# Patient Record
Sex: FEMALE | Race: WHITE | Employment: OTHER | URBAN - METROPOLITAN AREA
[De-identification: names, ages, dates, MRNs, and addresses within clinical notes are randomized per-mention and may not be internally consistent; named-entity substitution may affect disease eponyms.]

---

## 2017-01-25 PROCEDURE — 85025 COMPLETE CBC W/AUTO DIFF WBC: CPT | Performed by: INTERNAL MEDICINE

## 2017-01-25 PROCEDURE — 36415 COLL VENOUS BLD VENIPUNCTURE: CPT | Performed by: INTERNAL MEDICINE

## 2017-01-30 PROCEDURE — 87338 HPYLORI STOOL AG IA: CPT | Performed by: INTERNAL MEDICINE

## 2017-03-08 PROBLEM — H90.3 SENSORINEURAL HEARING LOSS, BILATERAL: Status: ACTIVE | Noted: 2017-03-08

## 2017-03-13 PROCEDURE — 83516 IMMUNOASSAY NONANTIBODY: CPT | Performed by: INTERNAL MEDICINE

## 2017-03-13 PROCEDURE — 87045 FECES CULTURE AEROBIC BACT: CPT | Performed by: INTERNAL MEDICINE

## 2017-03-13 PROCEDURE — 82656 EL-1 FECAL QUAL/SEMIQ: CPT | Performed by: INTERNAL MEDICINE

## 2017-03-13 PROCEDURE — 82607 VITAMIN B-12: CPT | Performed by: INTERNAL MEDICINE

## 2017-03-13 PROCEDURE — 82784 ASSAY IGA/IGD/IGG/IGM EACH: CPT | Performed by: INTERNAL MEDICINE

## 2017-03-13 PROCEDURE — 36415 COLL VENOUS BLD VENIPUNCTURE: CPT | Performed by: INTERNAL MEDICINE

## 2017-03-13 PROCEDURE — 87046 STOOL CULTR AEROBIC BACT EA: CPT | Performed by: INTERNAL MEDICINE

## 2017-03-13 PROCEDURE — 86255 FLUORESCENT ANTIBODY SCREEN: CPT | Performed by: INTERNAL MEDICINE

## 2017-03-13 PROCEDURE — 82746 ASSAY OF FOLIC ACID SERUM: CPT | Performed by: INTERNAL MEDICINE

## 2017-03-17 PROBLEM — K86.89 PANCREATIC INSUFFICIENCY: Status: ACTIVE | Noted: 2017-03-17

## 2017-12-21 ENCOUNTER — APPOINTMENT (OUTPATIENT)
Dept: CT IMAGING | Facility: HOSPITAL | Age: 67
End: 2017-12-21
Attending: EMERGENCY MEDICINE
Payer: COMMERCIAL

## 2017-12-21 ENCOUNTER — HOSPITAL ENCOUNTER (EMERGENCY)
Facility: HOSPITAL | Age: 67
Discharge: HOME | End: 2017-12-21
Attending: EMERGENCY MEDICINE
Payer: COMMERCIAL

## 2017-12-21 VITALS
TEMPERATURE: 98 F | WEIGHT: 122 LBS | HEART RATE: 82 BPM | RESPIRATION RATE: 18 BRPM | OXYGEN SATURATION: 98 % | SYSTOLIC BLOOD PRESSURE: 135 MMHG | HEIGHT: 63 IN | DIASTOLIC BLOOD PRESSURE: 78 MMHG | BODY MASS INDEX: 21.62 KG/M2

## 2017-12-21 DIAGNOSIS — S39.011A STRAIN OF RECTUS ABDOMINIS MUSCLE, INITIAL ENCOUNTER: Primary | ICD-10-CM

## 2017-12-21 PROCEDURE — 96374 THER/PROPH/DIAG INJ IV PUSH: CPT

## 2017-12-21 PROCEDURE — 80053 COMPREHEN METABOLIC PANEL: CPT | Performed by: EMERGENCY MEDICINE

## 2017-12-21 PROCEDURE — 96361 HYDRATE IV INFUSION ADD-ON: CPT

## 2017-12-21 PROCEDURE — 96375 TX/PRO/DX INJ NEW DRUG ADDON: CPT

## 2017-12-21 PROCEDURE — 96376 TX/PRO/DX INJ SAME DRUG ADON: CPT

## 2017-12-21 PROCEDURE — 99284 EMERGENCY DEPT VISIT MOD MDM: CPT

## 2017-12-21 PROCEDURE — 74176 CT ABD & PELVIS W/O CONTRAST: CPT | Performed by: EMERGENCY MEDICINE

## 2017-12-21 PROCEDURE — 85025 COMPLETE CBC W/AUTO DIFF WBC: CPT | Performed by: EMERGENCY MEDICINE

## 2017-12-21 PROCEDURE — 81003 URINALYSIS AUTO W/O SCOPE: CPT | Performed by: EMERGENCY MEDICINE

## 2017-12-21 PROCEDURE — 83690 ASSAY OF LIPASE: CPT | Performed by: EMERGENCY MEDICINE

## 2017-12-21 RX ORDER — ONDANSETRON 2 MG/ML
4 INJECTION INTRAMUSCULAR; INTRAVENOUS ONCE
Status: COMPLETED | OUTPATIENT
Start: 2017-12-21 | End: 2017-12-21

## 2017-12-21 RX ORDER — HYDROMORPHONE HYDROCHLORIDE 1 MG/ML
0.5 INJECTION, SOLUTION INTRAMUSCULAR; INTRAVENOUS; SUBCUTANEOUS EVERY 30 MIN PRN
Status: DISCONTINUED | OUTPATIENT
Start: 2017-12-21 | End: 2017-12-21

## 2017-12-21 RX ORDER — HYDROCODONE BITARTRATE AND ACETAMINOPHEN 5; 325 MG/1; MG/1
1-2 TABLET ORAL EVERY 4 HOURS PRN
Qty: 20 TABLET | Refills: 0 | Status: SHIPPED | OUTPATIENT
Start: 2017-12-21 | End: 2017-12-28

## 2017-12-21 RX ORDER — SODIUM CHLORIDE 9 MG/ML
125 INJECTION, SOLUTION INTRAVENOUS CONTINUOUS
Status: DISCONTINUED | OUTPATIENT
Start: 2017-12-21 | End: 2017-12-21

## 2017-12-21 NOTE — ED PROVIDER NOTES
Patient Seen in: [facility] Emergency Department    History   Patient presents with:  Abdomen/Flank Pain (GI/)    Stated Complaint: abdominal pain    HPI    54-year-old female presents to the emergency department for evaluation of left-sided abdom function in past. no further problems   • Renal insufficiency    • Unspecified essential hypertension    • Unspecified sinusitis (chronic)    • Urinary tract infection    • Valvular disease     MVP   • Visual impairment     Right eye       Past Surgical Hi 18  Temp: 97.9 °F (36.6 °C)  Temp src: Temporal  SpO2: 100 %  O2 Device: None (Room air)    Current:/89   Pulse 79   Temp 97.9 °F (36.6 °C) (Temporal)   Resp 18   Ht 160 cm (5' 3\")   Wt 55.3 kg   SpO2 98%   BMI 21.61 kg/m²         Physical Exam    G for these tests on the individual orders.    URINALYSIS WITH CULTURE REFLEX       ED Course as of Dec 21 1806  ------------------------------------------------------------  Intravenous access was obtained and the patient was treated with IV fluids, analgesi

## 2017-12-21 NOTE — ED INITIAL ASSESSMENT (HPI)
Pt bent over this afternoon and felt \"pop\" on left side of abdomen and felt pain, Pt called Dr. Oglesby [place] office who advised Pt to come to ER for further evaluation of possible hernia, PT hx bowel obstruction, MS & COPD

## 2018-10-02 PROBLEM — M17.12 PRIMARY OSTEOARTHRITIS OF LEFT KNEE: Status: ACTIVE | Noted: 2018-10-02

## 2018-10-08 ENCOUNTER — HOSPITAL ENCOUNTER (OUTPATIENT)
Dept: ULTRASOUND IMAGING | Age: 68
Discharge: HOME | End: 2018-10-08
Attending: ORTHOPAEDIC SURGERY
Payer: COMMERCIAL

## 2018-10-08 DIAGNOSIS — M17.12 PRIMARY OSTEOARTHRITIS OF LEFT KNEE: ICD-10-CM

## 2018-10-08 DIAGNOSIS — M79.662 PAIN OF LEFT CALF: ICD-10-CM

## 2018-10-08 PROCEDURE — 93971 EXTREMITY STUDY: CPT | Performed by: ORTHOPAEDIC SURGERY

## 2018-10-31 PROCEDURE — 88305 TISSUE EXAM BY PATHOLOGIST: CPT | Performed by: INTERNAL MEDICINE

## 2019-03-08 PROBLEM — M87.00 AVASCULAR NECROSIS (HCC): Status: ACTIVE | Noted: 2019-03-08

## 2019-03-20 PROBLEM — M25.562 LEFT KNEE PAIN, UNSPECIFIED CHRONICITY: Status: ACTIVE | Noted: 2019-03-20

## 2019-03-20 PROBLEM — M25.562 CHRONIC PAIN OF LEFT KNEE: Status: ACTIVE | Noted: 2019-03-20

## 2019-03-20 PROBLEM — G89.29 CHRONIC PAIN OF LEFT KNEE: Status: ACTIVE | Noted: 2019-03-20

## 2019-05-06 RX ORDER — OXYCODONE HCL 10 MG/1
10 TABLET, FILM COATED, EXTENDED RELEASE ORAL
Status: CANCELLED | OUTPATIENT
Start: 2019-05-07 | End: 2019-05-07

## 2019-05-06 RX ORDER — CEFAZOLIN SODIUM/WATER 2 G/20 ML
2 SYRINGE (ML) INTRAVENOUS ONCE
Status: CANCELLED | OUTPATIENT
Start: 2019-05-06 | End: 2019-05-06

## 2019-06-04 ENCOUNTER — LAB ENCOUNTER (OUTPATIENT)
Dept: LAB | Facility: HOSPITAL | Age: 69
End: 2019-06-04
Attending: ORTHOPAEDIC SURGERY
Payer: COMMERCIAL

## 2019-06-04 DIAGNOSIS — M17.12 PRIMARY OSTEOARTHRITIS OF LEFT KNEE: ICD-10-CM

## 2019-06-04 PROBLEM — Z98.890 S/P NAIL SURGERY: Status: ACTIVE | Noted: 2019-06-04

## 2019-06-04 PROCEDURE — 87147 CULTURE TYPE IMMUNOLOGIC: CPT

## 2019-06-04 PROCEDURE — 87081 CULTURE SCREEN ONLY: CPT

## 2019-06-13 ENCOUNTER — APPOINTMENT (OUTPATIENT)
Dept: GENERAL RADIOLOGY | Facility: HOSPITAL | Age: 69
DRG: 470 | End: 2019-06-13
Attending: PHYSICIAN ASSISTANT
Payer: COMMERCIAL

## 2019-06-13 ENCOUNTER — ANESTHESIA (OUTPATIENT)
Dept: SURGERY | Facility: HOSPITAL | Age: 69
End: 2019-06-13

## 2019-06-13 ENCOUNTER — ANESTHESIA EVENT (OUTPATIENT)
Dept: SURGERY | Facility: HOSPITAL | Age: 69
End: 2019-06-13

## 2019-06-13 ENCOUNTER — HOSPITAL ENCOUNTER (INPATIENT)
Facility: HOSPITAL | Age: 69
LOS: 3 days | Discharge: HOME HEALTH | DRG: 470 | End: 2019-06-17
Attending: ORTHOPAEDIC SURGERY | Admitting: ORTHOPAEDIC SURGERY
Payer: COMMERCIAL

## 2019-06-13 DIAGNOSIS — T84.84XA PAINFUL ORTHOPAEDIC HARDWARE (HCC): ICD-10-CM

## 2019-06-13 DIAGNOSIS — M17.12 PRIMARY OSTEOARTHRITIS OF LEFT KNEE: ICD-10-CM

## 2019-06-13 PROCEDURE — 0SRD0J9 REPLACEMENT OF LEFT KNEE JOINT WITH SYNTHETIC SUBSTITUTE, CEMENTED, OPEN APPROACH: ICD-10-PCS | Performed by: ORTHOPAEDIC SURGERY

## 2019-06-13 PROCEDURE — S0028 INJECTION, FAMOTIDINE, 20 MG: HCPCS

## 2019-06-13 PROCEDURE — 76942 ECHO GUIDE FOR BIOPSY: CPT | Performed by: ORTHOPAEDIC SURGERY

## 2019-06-13 PROCEDURE — 0QPH04Z REMOVAL OF INTERNAL FIXATION DEVICE FROM LEFT TIBIA, OPEN APPROACH: ICD-10-PCS | Performed by: ORTHOPAEDIC SURGERY

## 2019-06-13 PROCEDURE — 88311 DECALCIFY TISSUE: CPT | Performed by: ORTHOPAEDIC SURGERY

## 2019-06-13 PROCEDURE — 88305 TISSUE EXAM BY PATHOLOGIST: CPT | Performed by: ORTHOPAEDIC SURGERY

## 2019-06-13 PROCEDURE — 86850 RBC ANTIBODY SCREEN: CPT

## 2019-06-13 PROCEDURE — 3E0T3BZ INTRODUCTION OF ANESTHETIC AGENT INTO PERIPHERAL NERVES AND PLEXI, PERCUTANEOUS APPROACH: ICD-10-PCS | Performed by: ANESTHESIOLOGY

## 2019-06-13 PROCEDURE — 96376 TX/PRO/DX INJ SAME DRUG ADON: CPT

## 2019-06-13 PROCEDURE — 73560 X-RAY EXAM OF KNEE 1 OR 2: CPT | Performed by: PHYSICIAN ASSISTANT

## 2019-06-13 PROCEDURE — 86900 BLOOD TYPING SEROLOGIC ABO: CPT

## 2019-06-13 PROCEDURE — 86901 BLOOD TYPING SEROLOGIC RH(D): CPT

## 2019-06-13 DEVICE — ATTUNE PATELLA MEDIALIZED ANATOMIC 35MM CEMENTED AOX
Type: IMPLANTABLE DEVICE | Site: KNEE | Status: FUNCTIONAL
Brand: ATTUNE

## 2019-06-13 DEVICE — ATTUNE KNEE SYSTEM TIBIAL INSERT ROTATING PLATFORM POSTERIOR STABILIZED 5 6MM AOX
Type: IMPLANTABLE DEVICE | Site: KNEE | Status: FUNCTIONAL
Brand: ATTUNE

## 2019-06-13 DEVICE — ATTUNE KNEE SYSTEM FEMORAL POSTERIOR STABILIZED SIZE 5 LEFT CEMENTED
Type: IMPLANTABLE DEVICE | Site: KNEE | Status: FUNCTIONAL
Brand: ATTUNE

## 2019-06-13 DEVICE — ATTUNE KNEE SYSTEM REVISION CEMENTED STEM CEMENTED 14X30MM
Type: IMPLANTABLE DEVICE | Site: KNEE | Status: FUNCTIONAL
Brand: ATTUNE

## 2019-06-13 DEVICE — SMARTSET GHV GENTAMICIN HIGH VISCOSITY BONE CEMENT 40G
Type: IMPLANTABLE DEVICE | Site: KNEE | Status: FUNCTIONAL
Brand: SMARTSET

## 2019-06-13 RX ORDER — SODIUM CHLORIDE, SODIUM LACTATE, POTASSIUM CHLORIDE, CALCIUM CHLORIDE 600; 310; 30; 20 MG/100ML; MG/100ML; MG/100ML; MG/100ML
INJECTION, SOLUTION INTRAVENOUS CONTINUOUS
Status: DISCONTINUED | OUTPATIENT
Start: 2019-06-13 | End: 2019-06-13 | Stop reason: HOSPADM

## 2019-06-13 RX ORDER — BISACODYL 10 MG
10 SUPPOSITORY, RECTAL RECTAL
Status: DISCONTINUED | OUTPATIENT
Start: 2019-06-13 | End: 2019-06-17

## 2019-06-13 RX ORDER — SODIUM CHLORIDE, SODIUM LACTATE, POTASSIUM CHLORIDE, CALCIUM CHLORIDE 600; 310; 30; 20 MG/100ML; MG/100ML; MG/100ML; MG/100ML
INJECTION, SOLUTION INTRAVENOUS CONTINUOUS
Status: DISCONTINUED | OUTPATIENT
Start: 2019-06-13 | End: 2019-06-15

## 2019-06-13 RX ORDER — TRAZODONE HYDROCHLORIDE 50 MG/1
100 TABLET ORAL NIGHTLY
COMMUNITY
End: 2019-06-24

## 2019-06-13 RX ORDER — ONDANSETRON 2 MG/ML
4 INJECTION INTRAMUSCULAR; INTRAVENOUS AS NEEDED
Status: DISCONTINUED | OUTPATIENT
Start: 2019-06-13 | End: 2019-06-13 | Stop reason: HOSPADM

## 2019-06-13 RX ORDER — HYDROMORPHONE HYDROCHLORIDE 1 MG/ML
0.8 INJECTION, SOLUTION INTRAMUSCULAR; INTRAVENOUS; SUBCUTANEOUS EVERY 2 HOUR PRN
Status: DISPENSED | OUTPATIENT
Start: 2019-06-13 | End: 2019-06-15

## 2019-06-13 RX ORDER — MELATONIN
325
Status: DISCONTINUED | OUTPATIENT
Start: 2019-06-13 | End: 2019-06-13

## 2019-06-13 RX ORDER — METOCLOPRAMIDE HYDROCHLORIDE 5 MG/ML
10 INJECTION INTRAMUSCULAR; INTRAVENOUS EVERY 6 HOURS PRN
Status: ACTIVE | OUTPATIENT
Start: 2019-06-13 | End: 2019-06-15

## 2019-06-13 RX ORDER — DILTIAZEM HYDROCHLORIDE 180 MG/1
360 CAPSULE, EXTENDED RELEASE ORAL DAILY
COMMUNITY
End: 2019-12-03

## 2019-06-13 RX ORDER — ACETAMINOPHEN 325 MG/1
650 TABLET ORAL 4 TIMES DAILY
Status: DISPENSED | OUTPATIENT
Start: 2019-06-13 | End: 2019-06-15

## 2019-06-13 RX ORDER — HYDROMORPHONE HYDROCHLORIDE 1 MG/ML
INJECTION, SOLUTION INTRAMUSCULAR; INTRAVENOUS; SUBCUTANEOUS
Status: COMPLETED
Start: 2019-06-13 | End: 2019-06-13

## 2019-06-13 RX ORDER — ALBUTEROL SULFATE 90 UG/1
2 AEROSOL, METERED RESPIRATORY (INHALATION) EVERY 4 HOURS PRN
Status: DISCONTINUED | OUTPATIENT
Start: 2019-06-13 | End: 2019-06-17

## 2019-06-13 RX ORDER — ZOLPIDEM TARTRATE 5 MG/1
5 TABLET ORAL NIGHTLY PRN
Status: DISCONTINUED | OUTPATIENT
Start: 2019-06-13 | End: 2019-06-17

## 2019-06-13 RX ORDER — OMEPRAZOLE 40 MG/1
40 CAPSULE, DELAYED RELEASE ORAL SEE ADMIN INSTRUCTIONS
COMMUNITY
End: 2019-11-09

## 2019-06-13 RX ORDER — ACETAMINOPHEN 500 MG
1000 TABLET ORAL ONCE
Status: DISCONTINUED | OUTPATIENT
Start: 2019-06-13 | End: 2019-06-13 | Stop reason: HOSPADM

## 2019-06-13 RX ORDER — OXYCODONE HYDROCHLORIDE 15 MG/1
15 TABLET ORAL EVERY 4 HOURS PRN
Status: DISCONTINUED | OUTPATIENT
Start: 2019-06-13 | End: 2019-06-14

## 2019-06-13 RX ORDER — NALOXONE HYDROCHLORIDE 0.4 MG/ML
80 INJECTION, SOLUTION INTRAMUSCULAR; INTRAVENOUS; SUBCUTANEOUS AS NEEDED
Status: DISCONTINUED | OUTPATIENT
Start: 2019-06-13 | End: 2019-06-13 | Stop reason: HOSPADM

## 2019-06-13 RX ORDER — OMEPRAZOLE 20 MG/1
20 CAPSULE, DELAYED RELEASE ORAL
Status: ON HOLD | COMMUNITY
End: 2019-06-15

## 2019-06-13 RX ORDER — ACETAMINOPHEN 325 MG/1
TABLET ORAL
Status: COMPLETED
Start: 2019-06-13 | End: 2019-06-13

## 2019-06-13 RX ORDER — PANTOPRAZOLE SODIUM 40 MG/1
40 TABLET, DELAYED RELEASE ORAL
Status: DISCONTINUED | OUTPATIENT
Start: 2019-06-14 | End: 2019-06-17

## 2019-06-13 RX ORDER — CEFAZOLIN SODIUM/WATER 2 G/20 ML
2 SYRINGE (ML) INTRAVENOUS EVERY 8 HOURS
Status: DISCONTINUED | OUTPATIENT
Start: 2019-06-13 | End: 2019-06-13

## 2019-06-13 RX ORDER — DILTIAZEM HYDROCHLORIDE 180 MG/1
360 CAPSULE, EXTENDED RELEASE ORAL DAILY
Status: DISCONTINUED | OUTPATIENT
Start: 2019-06-13 | End: 2019-06-17

## 2019-06-13 RX ORDER — HYDROMORPHONE HYDROCHLORIDE 1 MG/ML
0.2 INJECTION, SOLUTION INTRAMUSCULAR; INTRAVENOUS; SUBCUTANEOUS EVERY 2 HOUR PRN
Status: ACTIVE | OUTPATIENT
Start: 2019-06-13 | End: 2019-06-15

## 2019-06-13 RX ORDER — POLYETHYLENE GLYCOL 3350 17 G/17G
17 POWDER, FOR SOLUTION ORAL DAILY PRN
Status: DISCONTINUED | OUTPATIENT
Start: 2019-06-13 | End: 2019-06-17

## 2019-06-13 RX ORDER — PRIMIDONE 50 MG/1
50 TABLET ORAL NIGHTLY
Status: DISCONTINUED | OUTPATIENT
Start: 2019-06-13 | End: 2019-06-13

## 2019-06-13 RX ORDER — PROCHLORPERAZINE EDISYLATE 5 MG/ML
10 INJECTION INTRAMUSCULAR; INTRAVENOUS EVERY 6 HOURS PRN
Status: ACTIVE | OUTPATIENT
Start: 2019-06-13 | End: 2019-06-15

## 2019-06-13 RX ORDER — SENNOSIDES 8.6 MG
17.2 TABLET ORAL NIGHTLY
Status: DISCONTINUED | OUTPATIENT
Start: 2019-06-13 | End: 2019-06-17

## 2019-06-13 RX ORDER — HYDROMORPHONE HYDROCHLORIDE 1 MG/ML
0.4 INJECTION, SOLUTION INTRAMUSCULAR; INTRAVENOUS; SUBCUTANEOUS EVERY 5 MIN PRN
Status: DISCONTINUED | OUTPATIENT
Start: 2019-06-13 | End: 2019-06-13 | Stop reason: HOSPADM

## 2019-06-13 RX ORDER — DIPHENHYDRAMINE HCL 25 MG
25 CAPSULE ORAL EVERY 4 HOURS PRN
Status: DISCONTINUED | OUTPATIENT
Start: 2019-06-13 | End: 2019-06-17

## 2019-06-13 RX ORDER — FUROSEMIDE 20 MG/1
20 TABLET ORAL
Status: DISCONTINUED | OUTPATIENT
Start: 2019-06-13 | End: 2019-06-17

## 2019-06-13 RX ORDER — DOCUSATE SODIUM 100 MG/1
100 CAPSULE, LIQUID FILLED ORAL 2 TIMES DAILY
Status: DISCONTINUED | OUTPATIENT
Start: 2019-06-13 | End: 2019-06-17

## 2019-06-13 RX ORDER — SODIUM CHLORIDE 9 MG/ML
INJECTION, SOLUTION INTRAVENOUS CONTINUOUS
Status: DISCONTINUED | OUTPATIENT
Start: 2019-06-13 | End: 2019-06-15

## 2019-06-13 RX ORDER — TIZANIDINE 2 MG/1
2 TABLET ORAL 3 TIMES DAILY PRN
Status: DISCONTINUED | OUTPATIENT
Start: 2019-06-13 | End: 2019-06-14

## 2019-06-13 RX ORDER — LABETALOL HYDROCHLORIDE 5 MG/ML
5 INJECTION, SOLUTION INTRAVENOUS EVERY 5 MIN PRN
Status: DISCONTINUED | OUTPATIENT
Start: 2019-06-13 | End: 2019-06-13 | Stop reason: HOSPADM

## 2019-06-13 RX ORDER — OXYCODONE HYDROCHLORIDE 5 MG/1
5 TABLET ORAL EVERY 4 HOURS PRN
Status: DISCONTINUED | OUTPATIENT
Start: 2019-06-13 | End: 2019-06-14

## 2019-06-13 RX ORDER — ACETAMINOPHEN 325 MG/1
650 TABLET ORAL ONCE
Status: COMPLETED | OUTPATIENT
Start: 2019-06-13 | End: 2019-06-13

## 2019-06-13 RX ORDER — OXYCODONE HYDROCHLORIDE 10 MG/1
10 TABLET ORAL EVERY 4 HOURS PRN
Status: DISCONTINUED | OUTPATIENT
Start: 2019-06-13 | End: 2019-06-14

## 2019-06-13 RX ORDER — SODIUM PHOSPHATE, DIBASIC AND SODIUM PHOSPHATE, MONOBASIC 7; 19 G/133ML; G/133ML
1 ENEMA RECTAL ONCE AS NEEDED
Status: DISCONTINUED | OUTPATIENT
Start: 2019-06-13 | End: 2019-06-17

## 2019-06-13 RX ORDER — FUROSEMIDE 40 MG/1
20 TABLET ORAL 2 TIMES DAILY
COMMUNITY
End: 2020-01-08

## 2019-06-13 RX ORDER — METOCLOPRAMIDE HYDROCHLORIDE 5 MG/ML
10 INJECTION INTRAMUSCULAR; INTRAVENOUS AS NEEDED
Status: DISCONTINUED | OUTPATIENT
Start: 2019-06-13 | End: 2019-06-13 | Stop reason: HOSPADM

## 2019-06-13 RX ORDER — DIPHENHYDRAMINE HYDROCHLORIDE 50 MG/ML
12.5 INJECTION INTRAMUSCULAR; INTRAVENOUS EVERY 4 HOURS PRN
Status: DISCONTINUED | OUTPATIENT
Start: 2019-06-13 | End: 2019-06-17

## 2019-06-13 RX ORDER — MELATONIN
325
Status: DISCONTINUED | OUTPATIENT
Start: 2019-06-14 | End: 2019-06-17

## 2019-06-13 RX ORDER — HYDROMORPHONE HYDROCHLORIDE 1 MG/ML
0.4 INJECTION, SOLUTION INTRAMUSCULAR; INTRAVENOUS; SUBCUTANEOUS EVERY 2 HOUR PRN
Status: DISPENSED | OUTPATIENT
Start: 2019-06-13 | End: 2019-06-15

## 2019-06-13 RX ORDER — DIPHENHYDRAMINE HYDROCHLORIDE 50 MG/ML
25 INJECTION INTRAMUSCULAR; INTRAVENOUS ONCE AS NEEDED
Status: ACTIVE | OUTPATIENT
Start: 2019-06-13 | End: 2019-06-13

## 2019-06-13 RX ORDER — MIDAZOLAM HYDROCHLORIDE 1 MG/ML
1 INJECTION INTRAMUSCULAR; INTRAVENOUS EVERY 5 MIN PRN
Status: DISCONTINUED | OUTPATIENT
Start: 2019-06-13 | End: 2019-06-13 | Stop reason: HOSPADM

## 2019-06-13 RX ORDER — ONDANSETRON 2 MG/ML
4 INJECTION INTRAMUSCULAR; INTRAVENOUS EVERY 4 HOURS PRN
Status: DISPENSED | OUTPATIENT
Start: 2019-06-13 | End: 2019-06-15

## 2019-06-13 RX ORDER — TRAZODONE HYDROCHLORIDE 100 MG/1
100 TABLET ORAL NIGHTLY
Status: DISCONTINUED | OUTPATIENT
Start: 2019-06-13 | End: 2019-06-17

## 2019-06-13 NOTE — BRIEF OP NOTE
Pre-Operative Diagnosis: Primary osteoarthritis of left knee [M17.12]  Painful orthopaedic hardware [facility]) [T84.84XA]     Post-Operative Diagnosis: Primary osteoarthritis of left knee [M17.12]Painful orthopaedic hardware ([address] [T84.84XA]      Procedure Perf

## 2019-06-13 NOTE — INTERVAL H&P NOTE
Pre-op Diagnosis: Primary osteoarthritis of left knee [M17.12]  Painful orthopaedic hardware [facility]) [T84.84XA]    The above referenced H&P was reviewed by Robert Morrison MD on 6/13/2019, the patient was examined and no significant changes have occurred in

## 2019-06-13 NOTE — CONSULTS
.  Reason for consult: periop mgmt    Consulted by: Dr. Taqueria Galvan    PCP: Annalisa Wells MD      History of Present Illness: Patient is a 78year old female with PMH sig for immunodeficiency, MS and L knee OA who presents L TKA revision with tibial nail remova 10/5/2015    Performed by Erasmo Hitchcock MD at [facility] MAIN OR   • ENDOSCOPIC ULTRASOUND (EUS) N/A 2/27/2014    Performed by Erasmo Hitchcock MD at [facility] ENDOSCOPY   • ERCP,DIAGNOSTIC     • ESOPHAGOGASTRODUODENOSCOPY (EGD) N/A 2/22/2016    Performed by Tomas Roth 50 MG Oral Tab Take 100 mg by mouth nightly. Disp:  Rfl:    furosemide 40 MG Oral Tab Take 20 mg by mouth 2 (two) times daily. Disp:  Rfl:    Acetaminophen (ACETAMINOPHEN EXTRA STRENGTH) 500 MG Oral Cap Take 1,000 mg by mouth one time.  Disp:  Rfl:    Sacch Tab EC Take 325 mg by mouth daily. Disp:  Rfl:    Multiple Vitamin (TAB-A-MAXWELL) Oral Tab Take 1 tablet by mouth daily. Disp:  Rfl:    Tiotropium Bromide Monohydrate (SPIRIVA HANDIHALER) 18 MCG Inhalation Cap Inhale 1 capsule into the lungs once daily.  Annetta use: No       Fam Hx  Family History   Problem Relation Age of Onset   • Cancer Sister         CA: breast metastasized to kidneys   • Breast Cancer Sister         dx age late 52's and again age 63's   • Breast Cancer Paternal Grandmother         age 43   • input(s): NA, K, CL, CO2, BUN, CREATSERUM, GLU, CA, CAION, MG, PHOS in the last 168 hours. No results for input(s): ALT, AST, ALB, AMYLASE, LIPASE, LDH in the last 168 hours.     Invalid input(s): ALPHOS, TBIL, DBIL, TPROT         ASSESSMENT / PLAN:    #

## 2019-06-13 NOTE — ANESTHESIA PREPROCEDURE EVALUATION
PRE-OP EVALUATION    Patient Name: Kavya Moreno    Pre-op Diagnosis: Primary osteoarthritis of left knee [M17.12]  Painful orthopaedic hardware ([address] Keyla Liang    Procedure(s):  LEFT TOTAL KNEE ARTHROPLASTY AND TIBIA NAIL REMOVAL    Surgeon(s) an hours as needed for Nausea. (Patient taking differently: 3 (three) times daily.  DISSOLVE 1 tablet (8 mg total) IN mouth every 8 (eight) hours as needed for Nausea. ) Disp: 60 tablet Rfl: 5   [DISCONTINUED] CARTIA  MG Oral Capsule SR 24 Hr TAKE TWO CA pain      (+) anxiety                            Past Surgical History:   Procedure Laterality Date   • APPENDECTOMY     • BENIGN BIOPSY LEFT  1996   • CHOLECYSTECTOMY     • COLECTOMY     • COLECTOMY     • ENDOSCOPIC RETROGRADE CHOLANGIOPANCREATOGRAPHY (ER Right 3/4/2015    Performed by Aquilino Stacy MD at [facility] MAIN OR   • TOTAL ABDOM HYSTERECTOMY     • TOTAL KNEE REPLACEMENT     • VENTRAL HERNIA WITH COMPONENT SEPARATION N/A 9/2/2015    Performed by Pippa Guo MD at [facility]

## 2019-06-13 NOTE — H&P
Clifford Wakefield   5/28/2019 1:30 PM   Office Visit   MRN:  [id number]   Description: 78year old female Provider: Baylee Baird MD Department:  Internal Medicine     Scanning Cover Sheet     Click to print Barcode Encounter Cover Sheet for scanning    Current Outpatient Medications:  Amoxicillin-Pot Clavulanate (AUGMENTIN) 875-125 MG Oral Tab Take 1 tablet by mouth every 12 (twelve) hours.  Disp: 20 tablet Rfl: 0   SUMATRIPTAN SUCCINATE 100 MG Oral Tab TAKE 1 TABLET BY MOUTH EVERY 2 (TWO) HOURS AS NEE Tiotropium Bromide Monohydrate (SPIRIVA HANDIHALER) 18 MCG Inhalation Cap Inhale 1 capsule into the lungs once daily.  Indication: COPD Disp: 30 capsule Rfl: 1   HYDROcodone-acetaminophen  MG Oral Tab Take 1 tablet by mouth every 6 (six) hours as need Worry: Not on file        Inability: Not on file      Transportation needs:        Medical: Not on file        Non-medical: Not on file    Tobacco Use      Smoking status: Former Smoker        Packs/day: 0.10        Years: 4.00        Pack years: .4 LUNGS: clear to auscultation  CARDIO: RRR without murmur  GI: good BS's,no masses, HSM or tenderness  EXTREMITIES: no cyanosis, clubbing or edema, r chin 2cm hematoma nl speech and swallow no d/l seen.  r forearm w bruising but o/w nl.  ekg reviewed, nl and

## 2019-06-13 NOTE — OR NURSING
Patient states she fell twice in the pre-op room on her buttocks after losing her balance, unwitnessed by staff. She denies any injury, states she only landed on her buttocks. Denies any vertigo, denies losing consciousness, denies hitting her head.  She st

## 2019-06-13 NOTE — ANESTHESIA POSTPROCEDURE EVALUATION
[id number] [facility] Patient Status:  Outpatient in a Bed   Age/Gender 78year old female MRN [id number]   Location [address] [facility] Attending Arturo Encarnacion MD   Hosp Day # 0 PCP MD Nata Joyce

## 2019-06-13 NOTE — OPERATIVE REPORT
PATIENT'S NAME: Loraine Asher   ATTENDING PHYSICIAN: Colt Wong MD   OPERATING PHYSICIAN: Colt Wong MD   PATIENT ACCOUNT#:   [de-identified]    LOCATION:  [address] [place] [address],[address] RECORD #:   [id number]    YOB: 1950  AD reapproximate the subcutaneous tissue before closing both incisions with staples. A sponge and Tegaderm were placed over the incisions before placing the leg pizano to be used for the total knee replacement.     A knife was used to make a longitudinal inci placed. The cutting guide was placed and pinned in position. The anterior and posterior femur cuts were made. The lollipop was used to confirm flexion gap was equal to the extension gap before finishing the chamfer cuts and removing the guide.   The goran the same fashion with cement. A clamp was used to hold it in place while the cement hardened.   The knee was then copiously irrigated with pulse lavage irrigation before placing Irrisept into the wound and allowing it to sit for 1 minute before evacuating

## 2019-06-14 ENCOUNTER — APPOINTMENT (OUTPATIENT)
Dept: ULTRASOUND IMAGING | Facility: HOSPITAL | Age: 69
DRG: 470 | End: 2019-06-14
Attending: PHYSICIAN ASSISTANT
Payer: COMMERCIAL

## 2019-06-14 PROBLEM — Z47.89 ORTHOPEDIC AFTERCARE: Status: ACTIVE | Noted: 2019-06-14

## 2019-06-14 PROCEDURE — 97150 GROUP THERAPEUTIC PROCEDURES: CPT

## 2019-06-14 PROCEDURE — 96376 TX/PRO/DX INJ SAME DRUG ADON: CPT

## 2019-06-14 PROCEDURE — 97530 THERAPEUTIC ACTIVITIES: CPT

## 2019-06-14 PROCEDURE — 97116 GAIT TRAINING THERAPY: CPT

## 2019-06-14 PROCEDURE — 96375 TX/PRO/DX INJ NEW DRUG ADDON: CPT

## 2019-06-14 PROCEDURE — 93971 EXTREMITY STUDY: CPT | Performed by: PHYSICIAN ASSISTANT

## 2019-06-14 PROCEDURE — 97166 OT EVAL MOD COMPLEX 45 MIN: CPT

## 2019-06-14 PROCEDURE — 97161 PT EVAL LOW COMPLEX 20 MIN: CPT

## 2019-06-14 PROCEDURE — 85027 COMPLETE CBC AUTOMATED: CPT | Performed by: PHYSICIAN ASSISTANT

## 2019-06-14 RX ORDER — OXYCODONE HCL 10 MG/1
10 TABLET, FILM COATED, EXTENDED RELEASE ORAL EVERY 12 HOURS
Status: DISCONTINUED | OUTPATIENT
Start: 2019-06-14 | End: 2019-06-17

## 2019-06-14 RX ORDER — OXYCODONE HYDROCHLORIDE 15 MG/1
15 TABLET ORAL EVERY 4 HOURS PRN
Status: DISPENSED | OUTPATIENT
Start: 2019-06-14 | End: 2019-06-15

## 2019-06-14 RX ORDER — GABAPENTIN 100 MG/1
100 CAPSULE ORAL
Status: DISCONTINUED | OUTPATIENT
Start: 2019-06-14 | End: 2019-06-17

## 2019-06-14 RX ORDER — TIZANIDINE 4 MG/1
4 TABLET ORAL EVERY 6 HOURS PRN
Status: DISCONTINUED | OUTPATIENT
Start: 2019-06-14 | End: 2019-06-17

## 2019-06-14 RX ORDER — TRAMADOL HYDROCHLORIDE 50 MG/1
50 TABLET ORAL EVERY 6 HOURS
Status: DISCONTINUED | OUTPATIENT
Start: 2019-06-14 | End: 2019-06-16

## 2019-06-14 RX ORDER — OXYCODONE HYDROCHLORIDE 10 MG/1
10 TABLET ORAL EVERY 4 HOURS PRN
Status: DISPENSED | OUTPATIENT
Start: 2019-06-14 | End: 2019-06-15

## 2019-06-14 RX ORDER — HYDROCODONE BITARTRATE AND ACETAMINOPHEN 10; 325 MG/1; MG/1
2 TABLET ORAL EVERY 4 HOURS PRN
Status: DISCONTINUED | OUTPATIENT
Start: 2019-06-15 | End: 2019-06-17

## 2019-06-14 RX ORDER — OXYCODONE HYDROCHLORIDE 5 MG/1
5 TABLET ORAL EVERY 4 HOURS PRN
Status: ACTIVE | OUTPATIENT
Start: 2019-06-14 | End: 2019-06-15

## 2019-06-14 RX ORDER — HYDROCODONE BITARTRATE AND ACETAMINOPHEN 10; 325 MG/1; MG/1
1 TABLET ORAL EVERY 4 HOURS PRN
Status: DISCONTINUED | OUTPATIENT
Start: 2019-06-15 | End: 2019-06-17

## 2019-06-14 RX ORDER — TIZANIDINE 2 MG/1
2 TABLET ORAL EVERY 6 HOURS PRN
Status: DISCONTINUED | OUTPATIENT
Start: 2019-06-14 | End: 2019-06-17

## 2019-06-14 RX ORDER — KETOROLAC TROMETHAMINE 15 MG/ML
15 INJECTION, SOLUTION INTRAMUSCULAR; INTRAVENOUS EVERY 6 HOURS PRN
Status: DISCONTINUED | OUTPATIENT
Start: 2019-06-14 | End: 2019-06-17

## 2019-06-14 NOTE — PAYOR COMM NOTE
--------------  ADMISSION REVIEW     Payor: [address] [place] [address] Road #:  [id number]  Authorization Number: [id number]    Admit date: 6/14/19  Admit time: 0802       Direct admit to OR for:    PREOPERATIVE DIAGNOSIS:  Left knee p replacement.     A knife was used to make a longitudinal incision at the anterior aspect of the knee.  The Bovie was used for subcutaneous dissection and hemostasis.  Dissection was carried down to the extensor mechanism, which was incised in line with the finishing the chamfer cuts and removing the guide.  The finishing guide was then placed and pinned in position.  The notch cut was made before removing the guide.  Attention was turned to the posterior aspect where the osteophytes were removed and soft tis wound and allowing it to sit for 1 minute before evacuating the solution.   TXA was then placed in the wound and allowed to sit for 3 minutes while the cement hardened.  When the cement had hardened, the knee was flexed to confirm no further cement needed t Ignacio Mendieta MD at [facility] ENDOSCOPY   • ENDOSCOPIC ULTRASOUND (EUS) N/A 10/5/2015     Performed by Ignacio Mendieta MD at [facility] MAIN OR   • ENDOSCOPIC ULTRASOUND (EUS) N/A 2/27/2014     Performed by Ignacio Mendieta MD at [facility] ENDOSCOPY   • ERCP,DIAGNOSTIC     History    Tobacco Use      Smoking status: Former Smoker        Packs/day: 0.10        Years: 4.00        Pack years: .4        Types: Cigarettes        Quit date: 1972        Years since quittin.4      Smokeless tobacco: Former User      Tobacc ADMINISTERED IN LAST 1 DAY:  acetaminophen (TYLENOL) tab 650 mg     Date Action Dose Route User    6/14/2019 1000 Given 650 mg Oral Johanna Wang RN    6/13/2019 2035 Given 650 mg Oral Ibis Jackson RN      acetaminophen (TYLENOL) tab 650 mg     D Action Dose Route User    6/14/2019 1206 Given 0.8 mg Intravenous Iris [place], RN    6/14/2019 0955 Given 0.8 mg Intravenous Iris [place], RN    6/14/2019 0555 Given 0.8 mg Intravenous [place] Paul, TIANA    6/14/2019 0231 Given 0.8 mg Ronaia Servando 6/14/2019 0522 Given 40 mg Oral Hunter TIANA Bella      [facility]) tab 17.2 mg     Date Action Dose Route User    6/13/2019 2035 Given 17.2 mg Oral Hunter TIANA Bella      0.9%  NaCl infusion     Date Action Dose Route User    6/13/2019 2147 New

## 2019-06-14 NOTE — PHYSICAL THERAPY NOTE
Order received for PT eval. Attempted to see Pt this am, however, Pt awaiting US Doppler to r/o DVT. Will follow up as patient appropriate and schedule permits.

## 2019-06-14 NOTE — HOME CARE LIAISON
MET WITH PTNT AND OFFERED CHOICE  OF AGENCIES. PTNT AGREEABLE TO [facility]. MET WITH PTNT TO DISCUSS HOME HEALTH SERVICES AND COVERAGE CRITERIA. PTNT AGREEABLE TO [facility]. PTNT GIVEN RESIDENTIAL BROCHURE.  RESIDENTIAL WITH PROVIDE SN/PT ON DISC

## 2019-06-14 NOTE — OCCUPATIONAL THERAPY NOTE
OCCUPATIONAL THERAPY      Patient off the floor for Doppler to r/o DVT. Will attempt later as medically appropriate.

## 2019-06-14 NOTE — PHYSICAL THERAPY NOTE
PHYSICAL THERAPY KNEE TREATMENT NOTE - INPATIENT     Room Number: 386/386-A     Session: 1&2   Number of Visits to Meet Established Goals: 6    Presenting Problem: s/p L TKA and tibial nail removal 6/13/19     History related to current admission: Pt is a 2/27/2014    Performed by Cory Salas MD at [facility] ENDOSCOPY   • ENDOSCOPIC ULTRASOUND (EUS) N/A 10/5/2015    Performed by Cory Salas MD at [facility] MAIN OR   • ENDOSCOPIC ULTRASOUND (EUS) N/A 2/27/2014    Performed by Cory Salas MD at [facility] ENDOSCOPY doing too much because I have MS. \"    Patient’s self-stated goal is to rest.    OBJECTIVE  Precautions:  (MS)    WEIGHT BEARING STATUS  Weight Bearing Restriction: L lower extremity           L Lower Extremity: Weight Bearing as Tolerated    PAIN ASSESSMEN assist level, Cues provided to improve heel to toe and step through pattern to promote normal gait pattern. Pt unable to tolerate heel to toe due to ankle pain in left le.  Pt needed encouragement to meet the distance goal.   Walker adjusted to right height assistive device at assistance level: supervision    Goal #4    AROM 0 degrees extension to 75 degrees flexion      Goal #5         Goal #6           Goal Comments: Goals established on 6/14/2019

## 2019-06-14 NOTE — PLAN OF CARE
Pt having a lot of pain, partially controlled with the PO pain meds on the ortho pain protocol. Requests the IV pain meds shortly after receiving the PO pain meds. Pt noted to be sleeping each time staff enters room, but arouses to her name being called.  H

## 2019-06-14 NOTE — PHYSICAL THERAPY NOTE
PHYSICAL THERAPY KNEE EVALUATION - INPATIENT     Room Number: 386/386-A  Evaluation Date: 6/14/2019  Type of Evaluation: Initial  Physician Order: PT Eval and Treat    Presenting Problem: s/p L TKA and tibial nail removal 6/13/19  Reason for Therapy: Bear [place] • COLECTOMY     • COLECTOMY     • ENDOSCOPIC RETROGRADE CHOLANGIOPANCREATOGRAPHY (ERCP) N/A 2/27/2014    Performed by Arsh Lowe MD at [facility] (EUS) N/A 10/5/2015    Performed by Arsh Lowe MD at [facility] N/A 9/2/2015    Performed by Petr Degroot MD at [facility]  Type of Home: House   Home Layout: Two level  Stairs to Enter : 2  Railing: Yes  Stairs to Bedroom: 12  Railing: Yes    Lives With: Spouse  Drives: Yes  [facility] A Little   How much help from another person does the patient currently need. ..   -   Moving to and from a bed to a chair (including a wheelchair)?: A Little   -   Need to walk in hospital room?: A Lot   -   Climbing 3-5 steps with a railing?: A Lot 6/13/2019 for L TKA and removal of tibial nail. Pertinent comorbidities and personal factors impacting therapy include MS.  In this PT evaluation, the patient presents with the following impairments L knee pain, limited L knee ROM, L LE strength deficits, b

## 2019-06-14 NOTE — PROGRESS NOTES
[facility]  Progress Note    [place] Patient Status:  Inpatient    3/8/1950 MRN [id number]   [facility] 3SW-A Attending Vane Sotelo MD   Hosp Day # 0 PCP Agustin Gómez MD     SUBJECTIVE:  INTERVAL HISTORY: S/P  0  Pro PT/OT  5. Discharge planning: [facility]  6.  Continue medical management      Humza Flood PA-C  6/14/2019  8:11 AM

## 2019-06-14 NOTE — PROGRESS NOTES
[facility] Hospitalist note    PCP: Nora Palm MD    Chief Complaint:  F/u s/p TKA revision    SUBJECTIVE:  Pt with severe pain this morning. Currently improved this afternoon. No sob/dizziness/nausea.      OBJECTIVE:  Temp:  [97.4 °F (36.3 °C)-98.8 °F (37.1 acetaminophen  650 mg Oral QID   • dilTIAZem HCl ER  360 mg Oral Daily   • Fluticasone Furoate-Vilanterol  1 puff Inhalation Daily   • furosemide  20 mg Oral BID (Diuretic)   • Nortriptyline HCl  75 mg Oral Nightly   • Pantoprazole Sodium  40 mg Oral QAM A

## 2019-06-14 NOTE — PROGRESS NOTES
Post Op Day 1 Ortho Note: Left TKA and tibial nail removal    Status Post Nerve Block:  Type of Nerve Block: Left adductor canal and IPACK  Single Injection Nerve Block    Post op review: No evidence of immediate block related complications, No paresthesia

## 2019-06-14 NOTE — PLAN OF CARE
Pt A & O x3, on 2L O2 PRN. SCDs. Eliquis. WBAT. PT/OT. Aquacel dressing, ACE wrap removed by US for doppler. Replaced Ace wrap. Gauze/tegaderm to left ankle saturated, reinforced with Ace wrap over it. Pt having lots of pain.  [address] [facility] notified with pain s

## 2019-06-14 NOTE — OCCUPATIONAL THERAPY NOTE
OCCUPATIONAL THERAPY EVALUATION - INPATIENT     Room Number: 386/386-A  Evaluation Date: 6/14/2019  Type of Evaluation: Initial  Presenting Problem: (L TKA revision with removal of tibial nail )    Physician Order: IP Consult to Occupational Therapy  Navin Past Surgical History  Past Surgical History:   Procedure Laterality Date   • APPENDECTOMY     • BENIGN BIOPSY LEFT  1996   • CHOLECYSTECTOMY     • COLECTOMY     • COLECTOMY     • ENDOSCOPIC RETROGRADE CHOLANGIOPANCREATOGRAPHY (ERCP) N/A 2/27/2014 Performed by Danielle Dietz MD at [facility] MAIN OR   • TOTAL ABDOM HYSTERECTOMY     • TOTAL KNEE REPLACEMENT     • VENTRAL HERNIA WITH COMPONENT SEPARATION N/A 9/2/2015    Performed by Nazanin Land MD at [address] [place] [address] O2 SATURATIONS                ACTIVITIES OF DAILY LIVING ASSESSMENT  AM-PAC ‘6-Clicks’ Inpatient Daily Activity Short Form  How much help from another person does the patient currently need…  -   Putting on and taking off regular lowe noted above. Functional outcome measures completed include AMPAC. In this OT evaluation patient presents with the following performance deficits: pain, fear of pain, decreased balance, decreased functional reach.   These deficits impact the patient’s abili Established Goals: 4    ADL GOALS   Patient will perform lower body dressing with supervision  Patient will perform toileting with supervision    FUNCTIONAL TRANSFER GOALS   Patient will perform supine to sit with Mod I  Patient will perform sit to supine

## 2019-06-14 NOTE — CM/SW NOTE
06/14/19 1500   CM/SW Referral Data   Referral Source Physician   Reason for Referral Discharge planning   Informant Patient;Spouse;Edward Staff   Patient Info   Patient's Mental Status Alert;Oriented   Patient's [address]   Number of Level

## 2019-06-15 PROCEDURE — 85027 COMPLETE CBC AUTOMATED: CPT | Performed by: PHYSICIAN ASSISTANT

## 2019-06-15 PROCEDURE — 97150 GROUP THERAPEUTIC PROCEDURES: CPT

## 2019-06-15 PROCEDURE — 97116 GAIT TRAINING THERAPY: CPT

## 2019-06-15 PROCEDURE — 97535 SELF CARE MNGMENT TRAINING: CPT

## 2019-06-15 RX ORDER — PRIMIDONE 50 MG/1
50 TABLET ORAL NIGHTLY
Status: DISCONTINUED | OUTPATIENT
Start: 2019-06-15 | End: 2019-06-17

## 2019-06-15 RX ORDER — ASPIRIN 325 MG
325 TABLET, DELAYED RELEASE (ENTERIC COATED) ORAL 2 TIMES DAILY
Status: DISCONTINUED | OUTPATIENT
Start: 2019-06-15 | End: 2019-06-17

## 2019-06-15 NOTE — PROGRESS NOTES
[facility]  Progress Note    Reita Card Patient Status:  Inpatient    3/8/1950 MRN [id number]   [facility] 3SW-A Attending Philomena Sanders MD   [facility] Day # 1 PCP Carlton Arteaga MD     SUBJECTIVE:  INTERVAL HISTORY: S/P  1  Pro goiter     Bronchiectasis with acute exacerbation (HCC)     Multiple sclerosis (HCC)     CVID (common variable immunodeficiency) (HCC)     Hemolytic anemia (HCC)     Anxiety     Migraine     Fatigue     Chronic pain syndrome     HTN (hypertension)     Long

## 2019-06-15 NOTE — PHYSICAL THERAPY NOTE
PHYSICAL THERAPY KNEE TREATMENT NOTE - INPATIENT     Room Number: 386/386-A     Session: 2  Number of Visits to Meet Established Goals: 6    Presenting Problem: s/p L TKA and tibial nail removal 6/13/19    Problem List  Active Problems:    Primary osteoar MD at [facility] MAIN OR   • ENDOSCOPIC ULTRASOUND (EUS) N/A 2/27/2014    Performed by Eldon Zavala MD at [facility] ENDOSCOPY   • ERCP,DIAGNOSTIC     • ESOPHAGOGASTRODUODENOSCOPY (EGD) N/A 2/22/2016    Performed by Eldon Zavala MD at [facility] ENDOSCOPY   • ESOPHAGOGAST Bearing Restriction: L lower extremity           L Lower Extremity: Weight Bearing as Tolerated    PAIN ASSESSMENT   Ratin  Location: L knee  Management Techniques:  Activity promotion    BALANCE  Static Sitting: Fair +  Dynamic Sittin [address] AM Session    Ankle Pumps 15reps    Quad Sets 15 reps    Glut Sets 15 reps    Hip Abd/Add 15 reps    Heel slides 10 reps    Saq 15 reps    SLR 15 reps    Sitting Knee Flexion 10 reps    Standing heel/toe raises 10 reps    Standing knee flexion 10 reps    E

## 2019-06-15 NOTE — PLAN OF CARE
Plan of care explained and updated with patient input. Progressing per plan of care. Patient remains alert and oriented to person, place, time and situation. On room air with continuous pulse oximetry monitoring.  Encouraged incentive spirometry and patient

## 2019-06-15 NOTE — PROGRESS NOTES
[facility] Hospitalist note    PCP: Raina Castellanos MD    Chief Complaint:  F/u s/p TKA revision    SUBJECTIVE:  Pain currently controlled  Noticing facial tremor off primidone. Concerned about being off of it.        OBJECTIVE:  Temp:  [97.4 °F (36.3 °C)-98.8 °F ( 1287   • sodium chloride 125 mL/hr at 06/13/19 3102     ketorolac (TORADOL) injection, tiZANidine HCl **OR** tiZANidine HCl, HYDROcodone-acetaminophen **OR** HYDROcodone-acetaminophen, Zolpidem Tartrate, PEG 3350, bisacodyl, FLEET ENEMA, ondansetron HCl, M

## 2019-06-15 NOTE — PROGRESS NOTES
Rn notified Dr. Powell Oh today that patient is requesting more pain medications at discharge including ultram and oxycontin ER as ordered presently for pain, stating she has good pain management now and wants to continue at discharge.  Md stated they will ass

## 2019-06-15 NOTE — CM/SW NOTE
Call from [place] with Jas Keenan [phone]. Pt accepted clinically to a private room pending insurance auth. SW informed her that pt is medically cleared for d/c when auth obtained.

## 2019-06-15 NOTE — OCCUPATIONAL THERAPY NOTE
Attempted to see pt this AM.  Pt requesting pain medicine prior to therapy. Communicated to RN. Will attempt again as schedule allows.  Parisa Bradley, MOT, OTR/L

## 2019-06-15 NOTE — PROGRESS NOTES
Acute Pain Service    Post Op Day 2 Ortho Note: Left TKA and tibia nail removal    Assessed patient in chair. States pain managed with medications; denies itching/nausea/dizziness. Reports she feels \"much better today\".      Able to bear weight on sx leg;

## 2019-06-15 NOTE — PLAN OF CARE
PLAN OF CARE DISCUSSED WITH PATIENT AND SPOUSE AT BEDSIDE THIS AM. CHANIRICHARD ZUÑIGA UPDATED ON ANTICOAGULANT INTERACTION WITH ELIQUIS AND HER HOME MED PRIMIDONE, SO THEY CHANGED HER TO ASA BID NOW STARTING TONIGHT AND RESUMED HER HOME PRIMIDONE FOR HER TREMORS

## 2019-06-15 NOTE — OCCUPATIONAL THERAPY NOTE
OCCUPATIONAL THERAPY TREATMENT NOTE - INPATIENT     Room Number: 386/386-A  Session: 1   Number of Visits to Meet Established Goals: 4    Presenting Problem: (L TKA revision with removal of tibial nail )    History related to current admission: Admitted fo CHOLECYSTECTOMY     • COLECTOMY     • COLECTOMY     • ENDOSCOPIC RETROGRADE CHOLANGIOPANCREATOGRAPHY (ERCP) N/A 2/27/2014    Performed by Meg Roa MD at [address] [place] [address] ([facility]) N/A 10/5/2015    Performed by Meg Roa MD COMPONENT SEPARATION N/A 9/2/2015    Performed by Mac Hardy MD at [address]  \"I don't know what I can do\"    Patient self-stated goal is to be as independent as possible    OBJECTIVE  Precautions:  (MS)    WEIGHT BEARING RESTRICTIO verbal prompting and encouragement throughout task. Pt demonstrated good distal reach and abilities to follow verbal prompting. Pt required Cedrick during sit<>stand transfer and clothing management to complete donning pants.   Pt participated in sit<>stand supervision     FUNCTIONAL TRANSFER GOALS -Progressing  Patient will perform supine to sit with Mod I  Patient will perform sit to supine with Mod I  Patient will transfer to toilet with supervision

## 2019-06-16 PROCEDURE — 85027 COMPLETE CBC AUTOMATED: CPT | Performed by: PHYSICIAN ASSISTANT

## 2019-06-16 PROCEDURE — 97535 SELF CARE MNGMENT TRAINING: CPT

## 2019-06-16 PROCEDURE — 97110 THERAPEUTIC EXERCISES: CPT

## 2019-06-16 PROCEDURE — 97530 THERAPEUTIC ACTIVITIES: CPT

## 2019-06-16 PROCEDURE — 97116 GAIT TRAINING THERAPY: CPT

## 2019-06-16 RX ORDER — TRAMADOL HYDROCHLORIDE 50 MG/1
50 TABLET ORAL EVERY 6 HOURS
Status: DISCONTINUED | OUTPATIENT
Start: 2019-06-16 | End: 2019-06-17

## 2019-06-16 NOTE — PROGRESS NOTES
[facility] Hospitalist note    PCP: Jonnathan Westfall MD    Chief Complaint:  F/u s/p TKA revision    SUBJECTIVE:  Feeling okay. Worked with PT. No sob/dizziness. BP has been higher.     OBJECTIVE:  Temp:  [98 °F (36.7 °C)-98.8 °F (37.1 °C)] 98.2 °F (36.8 °C)  Pulse: mL/kg/min Intravenous Once   • dilTIAZem HCl ER  360 mg Oral Daily   • Fluticasone Furoate-Vilanterol  1 puff Inhalation Daily   • furosemide  20 mg Oral BID (Diuretic)   • Nortriptyline HCl  75 mg Oral Nightly   • Pantoprazole Sodium  40 mg Oral QAM AC

## 2019-06-16 NOTE — OCCUPATIONAL THERAPY NOTE
OCCUPATIONAL THERAPY TREATMENT NOTE - INPATIENT     Room Number: 386/386-A  Session: 2   Number of Visits to Meet Established Goals: 4    Presenting Problem: (L TKA revision with removal of tibial nail )    History related to current admission: Admitted fo CHOLECYSTECTOMY     • COLECTOMY     • COLECTOMY     • ENDOSCOPIC RETROGRADE CHOLANGIOPANCREATOGRAPHY (ERCP) N/A 2/27/2014    Performed by Maxine Segovia MD at [address] [place] [address] ([facility]) N/A 10/5/2015    Performed by Maxine Segovia MD COMPONENT SEPARATION N/A 9/2/2015    Performed by Carmen Jackson MD at [address]  \"I have to keep moving\"    Patient self-stated goal is to be as independent as possible    OBJECTIVE  Precautions:  (MS)    WEIGHT BEARING RESTRICTION  We chair;Call light within reach; Needs met;RN aware of session/findings; All patient questions and concerns addressed;SCDs in place; Ice applied    ASSESSMENT   Pt seen for OT services.  In this session, pt made significant progress towards OT goals with improve

## 2019-06-16 NOTE — PROGRESS NOTES
[facility]  Progress Note    Armando Dyer Patient Status:  Inpatient    3/8/1950 MRN [id number]   [facility] 3SW-A Attending Baron Bernabe MD   Hosp Day # 2 PCP Charissa Cruz MD     SUBJECTIVE:  INTERVAL HISTORY: POD#3 KNEE Neuralgia, neuritis, and radiculitis, unspecified     Opioid abuse, continuous (HCC)     Nontoxic uninodular goiter     Bronchiectasis with acute exacerbation ([facility] [address]     Multiple sclerosis ([facility] [address]     CVID (common variable immunodeficiency) ([facility] [address]     Hemolyti

## 2019-06-16 NOTE — PLAN OF CARE
Pt ambulating with SBA and walker. Aquacel dressing with small amt old drainage. Gauze dressing dry and intact to ankle. Taking Oxy ER, tramadol and Norco for pain control with relief. Plans Anisa Lawson tomorrow if OK with insurance.    Ligia Ray

## 2019-06-16 NOTE — PLAN OF CARE
Plan of care explained and updated with patient input. Progressing per plan of care. Patient remains alert and oriented to person, place, time and situation. On room air with continuous pulse oximetry monitoring. Administered aspirin as ordered.  Encouraged

## 2019-06-17 VITALS
OXYGEN SATURATION: 54 % | WEIGHT: 129.63 LBS | RESPIRATION RATE: 18 BRPM | HEIGHT: 63 IN | TEMPERATURE: 98 F | BODY MASS INDEX: 22.97 KG/M2 | SYSTOLIC BLOOD PRESSURE: 163 MMHG | HEART RATE: 83 BPM | DIASTOLIC BLOOD PRESSURE: 61 MMHG

## 2019-06-17 PROCEDURE — 97116 GAIT TRAINING THERAPY: CPT

## 2019-06-17 PROCEDURE — 97530 THERAPEUTIC ACTIVITIES: CPT

## 2019-06-17 RX ORDER — TRAMADOL HYDROCHLORIDE 50 MG/1
TABLET ORAL EVERY 8 HOURS PRN
Qty: 20 TABLET | Refills: 0 | Status: SHIPPED | OUTPATIENT
Start: 2019-06-17 | End: 2019-06-24

## 2019-06-17 NOTE — CM/SW NOTE
Informed by Trish Florez that ins [facility] Hint is still pending. Informed by therapy that rec has changed to HHPT and pt does want to d/c home. [facility] [phone] had pre-op referral for home RN/PT services and pt agreed with this plan if able to d/c home.

## 2019-06-17 NOTE — PROGRESS NOTES
[facility]  Progress Note    Landon Hyde Patient Status:  Inpatient    3/8/1950 MRN [id number]   [facility] 3SW-A Attending Ashley Sanders MD   Hosp Day # 3 PCP Rip Salinas MD     SUBJECTIVE:  INTERVAL HISTORY: S/P  3  Pro

## 2019-06-17 NOTE — CM/SW NOTE
06/17/19 1546   Discharge disposition   Expected discharge disposition Home-Health   Name of Facillity/Home Care/Hospice Residential   Discharge transportation Private car

## 2019-06-17 NOTE — DISCHARGE SUMMARY
General Medicine Discharge Summary     Patient ID:  Mohan Frankel  78year old  3/8/1950    Admit date: 6/13/2019    Discharge date and time: 6/17/19    Attending Physician: Yaniv Raya MD None.  INDICATIONS:  Left knee replacement  PATIENT STATED HISTORY: (As transcribed by Technologist)  Patient offered no additional history at this time. FINDINGS:  Postoperative changes of left knee arthroplasty with hardware in good alignment.  Air wit stable union at the tibia and fibula fractures  with stable rickie fixation    Xr Finger(s) (min 2 Views), Right 2nd (cpt=73140)    Result Date: 5/20/2019  DATE OF SERVICE: 05.20.2019 XRAY RIGHT SECOND FINGER CLINICAL INFORMATION: Unspecified fall, initial en The elbow joint is intact and demonstrates spurring along the ventral radial neck. Follow up is recommended if the patient remains clinically symptomatic.     IMPRESSION: Soft tissue swelling over the ulnar aspect of the wrist. Otherwise, the forearm demon increased since the November 2013 MRI. However, this apparent progression is likely in large part accounted for by acquisition of a 3-D FLAIR sequence on a 3 Oma magnet on today's study.  Many lesions now demonstrate corresponding T1 hypointensity indicat Doppler spectral analysis, and color flow. Doppler imaging were performed. The following veins were imaged:  Common, deep, and superficial femoral, popliteal, sapheno-femoral junction, posterior tibial veins, and the contralateral common femoral vein.   P 24 Hr  Take 360 mg by mouth daily. mupirocin 2 % External Ointment  Use a q-tip to apply a pea-size amount of ointment to each nostril twice daily, morning and evening. After application press nostrils together several times to distribute ointment.   St

## 2019-06-17 NOTE — PLAN OF CARE
Problem: Diabetes/Glucose Control  Goal: Glucose maintained within prescribed range  Description  INTERVENTIONS:  - Monitor Blood Glucose as ordered  - Assess for signs and symptoms of hyperglycemia and hypoglycemia  - Administer ordered medications to m patient/family in tolerated functional activity level and precautions during self-care     Outcome: Completed     Problem: Patient/Family Goals  Goal: Patient/Family Long Term Goal  Description  Patient's Long Term Goal: to increase activity and decrease p

## 2019-06-17 NOTE — PROGRESS NOTES
Operative leg measured for tubigrip. Size E applied to left calf and size F applied to left knee. Patient instructed;verbalized understanding. Reviewed dressing changes with patient before beginning discharge education video.

## 2019-06-17 NOTE — PHYSICAL THERAPY NOTE
PHYSICAL THERAPY KNEE TREATMENT NOTE - INPATIENT     Room Number: 386/386-A     Session: 5   Number of Visits to Meet Established Goals: 6    Presenting Problem: s/p L TKA and tibial nail removal 6/13/19    Problem List  Active Problems:    Primary osteoa MD at [facility] MAIN OR   • ENDOSCOPIC ULTRASOUND (EUS) N/A 2/27/2014    Performed by Shirley Fonseca MD at [facility] ENDOSCOPY   • ERCP,DIAGNOSTIC     • ESOPHAGOGASTRODUODENOSCOPY (EGD) N/A 2/22/2016    Performed by Shirley Fonseca MD at [facility] ENDOSCOPY   • ESOPHAGOGAST STATUS  Weight Bearing Restriction: L lower extremity           L Lower Extremity: Weight Bearing as Tolerated    PAIN ASSESSMENT   Ratin  Location: L knee  Management Techniques:  Activity promotion;Repositioning    BALANCE  Static Sitting: Fair +  Dyn Pumps 20 reps  reps   Quad Sets 10 reps  reps   Glut Sets  reps  reps   Hip Abd/Add  reps  reps   Heel slides 10 reps  reps   Saq  reps  reps   SLR 10 reps  reps   Sitting Knee Flexion 10 reps  reps   Standing heel/toe raises  reps  reps   Standing knee fl

## 2019-07-22 ENCOUNTER — APPOINTMENT (OUTPATIENT)
Dept: CT IMAGING | Facility: HOSPITAL | Age: 69
End: 2019-07-22
Attending: EMERGENCY MEDICINE
Payer: COMMERCIAL

## 2019-07-22 ENCOUNTER — HOSPITAL ENCOUNTER (EMERGENCY)
Facility: HOSPITAL | Age: 69
Discharge: HOME | End: 2019-07-22
Attending: EMERGENCY MEDICINE
Payer: COMMERCIAL

## 2019-07-22 ENCOUNTER — APPOINTMENT (OUTPATIENT)
Dept: GENERAL RADIOLOGY | Facility: HOSPITAL | Age: 69
End: 2019-07-22
Attending: EMERGENCY MEDICINE
Payer: COMMERCIAL

## 2019-07-22 VITALS
OXYGEN SATURATION: 99 % | HEIGHT: 63 IN | RESPIRATION RATE: 24 BRPM | WEIGHT: 121 LBS | DIASTOLIC BLOOD PRESSURE: 81 MMHG | TEMPERATURE: 98 F | HEART RATE: 84 BPM | SYSTOLIC BLOOD PRESSURE: 95 MMHG | BODY MASS INDEX: 21.44 KG/M2

## 2019-07-22 DIAGNOSIS — R07.9 CHEST PAIN OF UNCERTAIN ETIOLOGY: Primary | ICD-10-CM

## 2019-07-22 LAB
ALBUMIN SERPL-MCNC: 3.2 G/DL (ref 3.4–5)
ALBUMIN/GLOB SERPL: 0.9 {RATIO} (ref 1–2)
ALP LIVER SERPL-CCNC: 178 U/L (ref 55–142)
ALT SERPL-CCNC: 21 U/L (ref 13–56)
ANION GAP SERPL CALC-SCNC: 10 MMOL/L (ref 0–18)
APTT PPP: 30.8 SECONDS (ref 25.4–36.1)
AST SERPL-CCNC: 21 U/L (ref 15–37)
ATRIAL RATE: 93 BPM
BASOPHILS # BLD AUTO: 0.05 X10(3) UL (ref 0–0.2)
BASOPHILS NFR BLD AUTO: 0.5 %
BILIRUB SERPL-MCNC: 0.2 MG/DL (ref 0.1–2)
BUN BLD-MCNC: 11 MG/DL (ref 7–18)
BUN/CREAT SERPL: 12.4 (ref 10–20)
CALCIUM BLD-MCNC: 9.7 MG/DL (ref 8.5–10.1)
CHLORIDE SERPL-SCNC: 104 MMOL/L (ref 98–112)
CO2 SERPL-SCNC: 21 MMOL/L (ref 21–32)
CREAT BLD-MCNC: 0.89 MG/DL (ref 0.55–1.02)
DEPRECATED RDW RBC AUTO: 49.1 FL (ref 35.1–46.3)
EOSINOPHIL # BLD AUTO: 0.29 X10(3) UL (ref 0–0.7)
EOSINOPHIL NFR BLD AUTO: 2.6 %
ERYTHROCYTE [DISTWIDTH] IN BLOOD BY AUTOMATED COUNT: 14.6 % (ref 11–15)
GLOBULIN PLAS-MCNC: 3.4 G/DL (ref 2.8–4.4)
GLUCOSE BLD-MCNC: 95 MG/DL (ref 70–99)
HCT VFR BLD AUTO: 35.8 % (ref 35–48)
HGB BLD-MCNC: 11.4 G/DL (ref 12–16)
IMM GRANULOCYTES # BLD AUTO: 0.02 X10(3) UL (ref 0–1)
IMM GRANULOCYTES NFR BLD: 0.2 %
INR BLD: 0.97 (ref 0.9–1.1)
LYMPHOCYTES # BLD AUTO: 2.49 X10(3) UL (ref 1–4)
LYMPHOCYTES NFR BLD AUTO: 22.7 %
M PROTEIN MFR SERPL ELPH: 6.6 G/DL (ref 6.4–8.2)
MCH RBC QN AUTO: 29.1 PG (ref 26–34)
MCHC RBC AUTO-ENTMCNC: 31.8 G/DL (ref 31–37)
MCV RBC AUTO: 91.3 FL (ref 80–100)
MONOCYTES # BLD AUTO: 0.8 X10(3) UL (ref 0.1–1)
MONOCYTES NFR BLD AUTO: 7.3 %
NEUTROPHILS # BLD AUTO: 7.34 X10 (3) UL (ref 1.5–7.7)
NEUTROPHILS # BLD AUTO: 7.34 X10(3) UL (ref 1.5–7.7)
NEUTROPHILS NFR BLD AUTO: 66.7 %
NT-PROBNP SERPL-MCNC: 60 PG/ML (ref ?–125)
OSMOLALITY SERPL CALC.SUM OF ELEC: 279 MOSM/KG (ref 275–295)
P AXIS: 74 DEGREES
P-R INTERVAL: 150 MS
PLATELET # BLD AUTO: 371 10(3)UL (ref 150–450)
POTASSIUM SERPL-SCNC: 3.6 MMOL/L (ref 3.5–5.1)
PSA SERPL DL<=0.01 NG/ML-MCNC: 13.3 SECONDS (ref 12.5–14.7)
Q-T INTERVAL: 342 MS
QRS DURATION: 82 MS
QTC CALCULATION (BEZET): 425 MS
R AXIS: 39 DEGREES
RBC # BLD AUTO: 3.92 X10(6)UL (ref 3.8–5.3)
SODIUM SERPL-SCNC: 135 MMOL/L (ref 136–145)
T AXIS: 52 DEGREES
TROPONIN I SERPL-MCNC: <0.045 NG/ML (ref ?–0.04)
VENTRICULAR RATE: 93 BPM
WBC # BLD AUTO: 11 X10(3) UL (ref 4–11)

## 2019-07-22 PROCEDURE — 84484 ASSAY OF TROPONIN QUANT: CPT | Performed by: EMERGENCY MEDICINE

## 2019-07-22 PROCEDURE — 99285 EMERGENCY DEPT VISIT HI MDM: CPT

## 2019-07-22 PROCEDURE — 85610 PROTHROMBIN TIME: CPT | Performed by: EMERGENCY MEDICINE

## 2019-07-22 PROCEDURE — 71045 X-RAY EXAM CHEST 1 VIEW: CPT | Performed by: EMERGENCY MEDICINE

## 2019-07-22 PROCEDURE — 71275 CT ANGIOGRAPHY CHEST: CPT | Performed by: EMERGENCY MEDICINE

## 2019-07-22 PROCEDURE — 96361 HYDRATE IV INFUSION ADD-ON: CPT

## 2019-07-22 PROCEDURE — 93010 ELECTROCARDIOGRAM REPORT: CPT

## 2019-07-22 PROCEDURE — 85025 COMPLETE CBC W/AUTO DIFF WBC: CPT | Performed by: EMERGENCY MEDICINE

## 2019-07-22 PROCEDURE — 93005 ELECTROCARDIOGRAM TRACING: CPT

## 2019-07-22 PROCEDURE — 96360 HYDRATION IV INFUSION INIT: CPT

## 2019-07-22 PROCEDURE — 85730 THROMBOPLASTIN TIME PARTIAL: CPT | Performed by: EMERGENCY MEDICINE

## 2019-07-22 PROCEDURE — 80053 COMPREHEN METABOLIC PANEL: CPT | Performed by: EMERGENCY MEDICINE

## 2019-07-22 PROCEDURE — 83880 ASSAY OF NATRIURETIC PEPTIDE: CPT | Performed by: EMERGENCY MEDICINE

## 2019-07-22 RX ORDER — ALPRAZOLAM 0.25 MG/1
0.25 TABLET ORAL ONCE
Status: COMPLETED | OUTPATIENT
Start: 2019-07-22 | End: 2019-07-22

## 2019-07-22 RX ORDER — SODIUM CHLORIDE 9 MG/ML
125 INJECTION, SOLUTION INTRAVENOUS CONTINUOUS
Status: DISCONTINUED | OUTPATIENT
Start: 2019-07-22 | End: 2019-07-22

## 2019-07-22 RX ORDER — NITROGLYCERIN 0.4 MG/1
0.4 TABLET SUBLINGUAL ONCE
Status: COMPLETED | OUTPATIENT
Start: 2019-07-22 | End: 2019-07-22

## 2019-07-22 NOTE — ED PROVIDER NOTES
Patient Seen in: [facility] Emergency Department    History   Patient presents with:  Dyspnea ELLEN SOB (respiratory)  Dizziness (neurologic)    Stated Complaint: ELLEN, Dizziness    HPI    This is a pleasant 26-year-old female, with complaints of diffi Surgical History:   Procedure Laterality Date   • APPENDECTOMY     • BENIGN BIOPSY LEFT  1996   • CHOLECYSTECTOMY     • COLECTOMY     • COLECTOMY     • ENDOSCOPIC RETROGRADE CHOLANGIOPANCREATOGRAPHY (ERCP) N/A 2/27/2014    Performed by Christiane Melo MD TIBIA OPEN REDUCTION INTERNAL FIXATION Right 3/4/2015    Performed by Kassandra Vizcarra MD at [facility] MAIN OR   • TOTAL ABDOM HYSTERECTOMY     • TOTAL KNEE REPLACEMENT     • VENTRAL HERNIA WITH COMPONENT SEPARATION N/A 9/2/2015    Performed by Emmanuelle Bergeron MD WITH PLATELET    Narrative: The following orders were created for panel order CBC WITH DIFFERENTIAL WITH PLATELET.   Procedure                               Abnormality         Status                     ---------                               ---------

## 2019-07-22 NOTE — ED NOTES
Pt c/o left sided chest pain with radiation to midsternal and right chest onset 1 hr. agol. +sob .  md updated

## 2019-07-22 NOTE — ED INITIAL ASSESSMENT (HPI)
Patient with c/o palpitations, SOB and HTN. Patient states she is unable to walk up the stairs to her apartment without becoming SOB. Patient had recent BP medications added last week.

## 2019-08-16 ENCOUNTER — HOSPITAL ENCOUNTER (EMERGENCY)
Facility: HOSPITAL | Age: 69
Discharge: HOME | End: 2019-08-16
Attending: EMERGENCY MEDICINE
Payer: COMMERCIAL

## 2019-08-16 VITALS
TEMPERATURE: 98 F | HEART RATE: 88 BPM | OXYGEN SATURATION: 97 % | SYSTOLIC BLOOD PRESSURE: 130 MMHG | BODY MASS INDEX: 21.44 KG/M2 | RESPIRATION RATE: 16 BRPM | HEIGHT: 63 IN | DIASTOLIC BLOOD PRESSURE: 78 MMHG | WEIGHT: 121 LBS

## 2019-08-16 DIAGNOSIS — T81.31XA SURGICAL WOUND DEHISCENCE, INITIAL ENCOUNTER: Primary | ICD-10-CM

## 2019-08-16 PROCEDURE — 99283 EMERGENCY DEPT VISIT LOW MDM: CPT

## 2019-08-16 PROCEDURE — 12002 RPR S/N/AX/GEN/TRNK2.6-7.5CM: CPT

## 2019-08-16 RX ORDER — CEPHALEXIN 500 MG/1
500 CAPSULE ORAL 4 TIMES DAILY
Qty: 28 CAPSULE | Refills: 0 | Status: SHIPPED | OUTPATIENT
Start: 2019-08-16 | End: 2019-08-23

## 2019-08-16 RX ORDER — HYDROCODONE BITARTRATE AND ACETAMINOPHEN 5; 325 MG/1; MG/1
1 TABLET ORAL ONCE
Status: COMPLETED | OUTPATIENT
Start: 2019-08-16 | End: 2019-08-16

## 2019-08-16 RX ORDER — HYDROCODONE BITARTRATE AND ACETAMINOPHEN 5; 325 MG/1; MG/1
1-2 TABLET ORAL EVERY 6 HOURS PRN
Qty: 10 TABLET | Refills: 0 | Status: SHIPPED | OUTPATIENT
Start: 2019-08-16 | End: 2019-08-23

## 2019-08-16 NOTE — ED INITIAL ASSESSMENT (HPI)
S/p knee replacement on her left knee in June and bumped her knee on the coffee table at 0530 today and the scar burst open and with pain.

## 2019-08-16 NOTE — ED PROVIDER NOTES
Patient Seen in: [facility] Emergency Department    History   Patient presents with:  Postop/Procedure Problem    Stated Complaint: Bumped knee after replacement sx, scar tissue \"burst open about 4 inches\"    HPI    65-year-old female presents rep Surgical History:   Procedure Laterality Date   • APPENDECTOMY     • BENIGN BIOPSY LEFT  1996   • CHOLECYSTECTOMY     • COLECTOMY     • COLECTOMY     • ENDOSCOPIC RETROGRADE CHOLANGIOPANCREATOGRAPHY (ERCP) N/A 2/27/2014    Performed by Margarita Holstein, MD TIBIA OPEN REDUCTION INTERNAL FIXATION Right 3/4/2015    Performed by iMke Tavarez MD at [facility] MAIN OR   • TOTAL ABDOM HYSTERECTOMY     • TOTAL KNEE REPLACEMENT     • VENTRAL HERNIA WITH COMPONENT SEPARATION N/A 9/2/2015    Performed by Roberto Carlos Kaur MD Discussed with Dr. Taqueria Galvan from orthopedics who request that we irrigate the wound with saline and Betadine and closed the wound. We will also discharge the patient with Keflex and she will follow-up with Dr. Taqueria Galvan early next week.   The patient is c

## 2019-09-22 ENCOUNTER — APPOINTMENT (OUTPATIENT)
Dept: GENERAL RADIOLOGY | Facility: HOSPITAL | Age: 69
End: 2019-09-22
Attending: EMERGENCY MEDICINE
Payer: COMMERCIAL

## 2019-09-22 ENCOUNTER — APPOINTMENT (OUTPATIENT)
Dept: GENERAL RADIOLOGY | Facility: HOSPITAL | Age: 69
End: 2019-09-22
Payer: COMMERCIAL

## 2019-09-22 ENCOUNTER — HOSPITAL ENCOUNTER (OUTPATIENT)
Facility: HOSPITAL | Age: 69
Setting detail: OBSERVATION
Discharge: NURSING, REHAB OR LONG-TERM CARE | End: 2019-09-24
Attending: EMERGENCY MEDICINE | Admitting: HOSPITALIST
Payer: COMMERCIAL

## 2019-09-22 DIAGNOSIS — S82.302A CLOSED FRACTURE OF DISTAL END OF LEFT TIBIA, UNSPECIFIED FRACTURE MORPHOLOGY, INITIAL ENCOUNTER: Primary | ICD-10-CM

## 2019-09-22 LAB
ANION GAP SERPL CALC-SCNC: 5 MMOL/L (ref 0–18)
BASOPHILS # BLD AUTO: 0.03 X10(3) UL (ref 0–0.2)
BASOPHILS NFR BLD AUTO: 0.4 %
BUN BLD-MCNC: 14 MG/DL (ref 7–18)
BUN/CREAT SERPL: 17.7 (ref 10–20)
CALCIUM BLD-MCNC: 8.4 MG/DL (ref 8.5–10.1)
CHLORIDE SERPL-SCNC: 102 MMOL/L (ref 98–112)
CO2 SERPL-SCNC: 28 MMOL/L (ref 21–32)
CREAT BLD-MCNC: 0.79 MG/DL (ref 0.55–1.02)
DEPRECATED RDW RBC AUTO: 48.1 FL (ref 35.1–46.3)
EOSINOPHIL # BLD AUTO: 0.06 X10(3) UL (ref 0–0.7)
EOSINOPHIL NFR BLD AUTO: 0.8 %
ERYTHROCYTE [DISTWIDTH] IN BLOOD BY AUTOMATED COUNT: 14.2 % (ref 11–15)
GLUCOSE BLD-MCNC: 101 MG/DL (ref 70–99)
HCT VFR BLD AUTO: 34.9 % (ref 35–48)
HGB BLD-MCNC: 10.9 G/DL (ref 12–16)
IMM GRANULOCYTES # BLD AUTO: 0.01 X10(3) UL (ref 0–1)
IMM GRANULOCYTES NFR BLD: 0.1 %
LYMPHOCYTES # BLD AUTO: 0.92 X10(3) UL (ref 1–4)
LYMPHOCYTES NFR BLD AUTO: 11.7 %
MCH RBC QN AUTO: 28.9 PG (ref 26–34)
MCHC RBC AUTO-ENTMCNC: 31.2 G/DL (ref 31–37)
MCV RBC AUTO: 92.6 FL (ref 80–100)
MONOCYTES # BLD AUTO: 0.45 X10(3) UL (ref 0.1–1)
MONOCYTES NFR BLD AUTO: 5.7 %
NEUTROPHILS # BLD AUTO: 6.36 X10 (3) UL (ref 1.5–7.7)
NEUTROPHILS # BLD AUTO: 6.36 X10(3) UL (ref 1.5–7.7)
NEUTROPHILS NFR BLD AUTO: 81.3 %
OSMOLALITY SERPL CALC.SUM OF ELEC: 281 MOSM/KG (ref 275–295)
PLATELET # BLD AUTO: 223 10(3)UL (ref 150–450)
RBC # BLD AUTO: 3.77 X10(6)UL (ref 3.8–5.3)
SODIUM SERPL-SCNC: 135 MMOL/L (ref 136–145)
WBC # BLD AUTO: 7.8 X10(3) UL (ref 4–11)

## 2019-09-22 PROCEDURE — 85025 COMPLETE CBC W/AUTO DIFF WBC: CPT | Performed by: EMERGENCY MEDICINE

## 2019-09-22 PROCEDURE — 73610 X-RAY EXAM OF ANKLE: CPT | Performed by: EMERGENCY MEDICINE

## 2019-09-22 PROCEDURE — 29515 APPLICATION SHORT LEG SPLINT: CPT

## 2019-09-22 PROCEDURE — 96375 TX/PRO/DX INJ NEW DRUG ADDON: CPT

## 2019-09-22 PROCEDURE — 73590 X-RAY EXAM OF LOWER LEG: CPT | Performed by: EMERGENCY MEDICINE

## 2019-09-22 PROCEDURE — 80048 BASIC METABOLIC PNL TOTAL CA: CPT | Performed by: EMERGENCY MEDICINE

## 2019-09-22 PROCEDURE — 96374 THER/PROPH/DIAG INJ IV PUSH: CPT

## 2019-09-22 PROCEDURE — 99285 EMERGENCY DEPT VISIT HI MDM: CPT

## 2019-09-22 RX ORDER — MORPHINE SULFATE 4 MG/ML
4 INJECTION, SOLUTION INTRAMUSCULAR; INTRAVENOUS EVERY 2 HOUR PRN
Status: DISCONTINUED | OUTPATIENT
Start: 2019-09-22 | End: 2019-09-24

## 2019-09-22 RX ORDER — HYDROCODONE BITARTRATE AND ACETAMINOPHEN 5; 325 MG/1; MG/1
1 TABLET ORAL EVERY 4 HOURS PRN
Status: DISCONTINUED | OUTPATIENT
Start: 2019-09-22 | End: 2019-09-23

## 2019-09-22 RX ORDER — MORPHINE SULFATE 4 MG/ML
4 INJECTION, SOLUTION INTRAMUSCULAR; INTRAVENOUS ONCE
Status: COMPLETED | OUTPATIENT
Start: 2019-09-22 | End: 2019-09-22

## 2019-09-22 RX ORDER — METOCLOPRAMIDE HYDROCHLORIDE 5 MG/ML
10 INJECTION INTRAMUSCULAR; INTRAVENOUS EVERY 8 HOURS PRN
Status: DISCONTINUED | OUTPATIENT
Start: 2019-09-22 | End: 2019-09-24

## 2019-09-22 RX ORDER — BENZONATATE 200 MG/1
200 CAPSULE ORAL 3 TIMES DAILY PRN
Status: DISCONTINUED | OUTPATIENT
Start: 2019-09-22 | End: 2019-09-24

## 2019-09-22 RX ORDER — HYDROCODONE BITARTRATE AND ACETAMINOPHEN 5; 325 MG/1; MG/1
2 TABLET ORAL EVERY 4 HOURS PRN
Status: DISCONTINUED | OUTPATIENT
Start: 2019-09-22 | End: 2019-09-23

## 2019-09-22 RX ORDER — MELATONIN
325
Status: DISCONTINUED | OUTPATIENT
Start: 2019-09-23 | End: 2019-09-24

## 2019-09-22 RX ORDER — SODIUM CHLORIDE 9 MG/ML
INJECTION, SOLUTION INTRAVENOUS CONTINUOUS
Status: DISCONTINUED | OUTPATIENT
Start: 2019-09-22 | End: 2019-09-24

## 2019-09-22 RX ORDER — FUROSEMIDE 20 MG/1
20 TABLET ORAL
Status: DISCONTINUED | OUTPATIENT
Start: 2019-09-22 | End: 2019-09-24

## 2019-09-22 RX ORDER — ACETAMINOPHEN 325 MG/1
650 TABLET ORAL EVERY 4 HOURS PRN
Status: DISCONTINUED | OUTPATIENT
Start: 2019-09-22 | End: 2019-09-24

## 2019-09-22 RX ORDER — ALBUTEROL SULFATE 90 UG/1
2 AEROSOL, METERED RESPIRATORY (INHALATION) EVERY 4 HOURS PRN
Status: DISCONTINUED | OUTPATIENT
Start: 2019-09-22 | End: 2019-09-24

## 2019-09-22 RX ORDER — PRIMIDONE 50 MG/1
50 TABLET ORAL 2 TIMES DAILY
Status: DISCONTINUED | OUTPATIENT
Start: 2019-09-22 | End: 2019-09-24

## 2019-09-22 RX ORDER — GABAPENTIN 300 MG/1
300 CAPSULE ORAL 2 TIMES DAILY
Status: DISCONTINUED | OUTPATIENT
Start: 2019-09-22 | End: 2019-09-24

## 2019-09-22 RX ORDER — DILTIAZEM HYDROCHLORIDE 180 MG/1
360 CAPSULE, EXTENDED RELEASE ORAL DAILY
Status: DISCONTINUED | OUTPATIENT
Start: 2019-09-23 | End: 2019-09-24

## 2019-09-22 RX ORDER — NORTRIPTYLINE HYDROCHLORIDE 50 MG/1
100 CAPSULE ORAL NIGHTLY
Status: DISCONTINUED | OUTPATIENT
Start: 2019-09-22 | End: 2019-09-24

## 2019-09-22 RX ORDER — DOCUSATE SODIUM 100 MG/1
100 CAPSULE, LIQUID FILLED ORAL 2 TIMES DAILY
Status: DISCONTINUED | OUTPATIENT
Start: 2019-09-22 | End: 2019-09-24

## 2019-09-22 RX ORDER — ONDANSETRON 2 MG/ML
4 INJECTION INTRAMUSCULAR; INTRAVENOUS EVERY 4 HOURS PRN
Status: DISCONTINUED | OUTPATIENT
Start: 2019-09-22 | End: 2019-09-22

## 2019-09-22 RX ORDER — HYDROCODONE BITARTRATE AND ACETAMINOPHEN 5; 325 MG/1; MG/1
1 TABLET ORAL ONCE
Status: COMPLETED | OUTPATIENT
Start: 2019-09-22 | End: 2019-09-22

## 2019-09-22 RX ORDER — ALPRAZOLAM 0.25 MG/1
0.25 TABLET ORAL 2 TIMES DAILY PRN
Status: DISCONTINUED | OUTPATIENT
Start: 2019-09-22 | End: 2019-09-24

## 2019-09-22 RX ORDER — LISINOPRIL 20 MG/1
20 TABLET ORAL DAILY
Status: DISCONTINUED | OUTPATIENT
Start: 2019-09-23 | End: 2019-09-24

## 2019-09-22 RX ORDER — MORPHINE SULFATE 4 MG/ML
1 INJECTION, SOLUTION INTRAMUSCULAR; INTRAVENOUS EVERY 2 HOUR PRN
Status: DISCONTINUED | OUTPATIENT
Start: 2019-09-22 | End: 2019-09-24

## 2019-09-22 RX ORDER — BISACODYL 10 MG
10 SUPPOSITORY, RECTAL RECTAL
Status: DISCONTINUED | OUTPATIENT
Start: 2019-09-22 | End: 2019-09-24

## 2019-09-22 RX ORDER — MORPHINE SULFATE 4 MG/ML
4 INJECTION, SOLUTION INTRAMUSCULAR; INTRAVENOUS EVERY 30 MIN PRN
Status: DISCONTINUED | OUTPATIENT
Start: 2019-09-22 | End: 2019-09-22

## 2019-09-22 RX ORDER — SODIUM PHOSPHATE, DIBASIC AND SODIUM PHOSPHATE, MONOBASIC 7; 19 G/133ML; G/133ML
1 ENEMA RECTAL ONCE AS NEEDED
Status: DISCONTINUED | OUTPATIENT
Start: 2019-09-22 | End: 2019-09-24

## 2019-09-22 RX ORDER — DOCUSATE SODIUM 100 MG/1
100 CAPSULE, LIQUID FILLED ORAL 2 TIMES DAILY
Status: DISCONTINUED | OUTPATIENT
Start: 2019-09-22 | End: 2019-09-22

## 2019-09-22 RX ORDER — MORPHINE SULFATE 4 MG/ML
2 INJECTION, SOLUTION INTRAMUSCULAR; INTRAVENOUS EVERY 2 HOUR PRN
Status: DISCONTINUED | OUTPATIENT
Start: 2019-09-22 | End: 2019-09-24

## 2019-09-22 RX ORDER — POLYETHYLENE GLYCOL 3350 17 G/17G
17 POWDER, FOR SOLUTION ORAL DAILY PRN
Status: DISCONTINUED | OUTPATIENT
Start: 2019-09-22 | End: 2019-09-24

## 2019-09-22 RX ORDER — ONDANSETRON 2 MG/ML
4 INJECTION INTRAMUSCULAR; INTRAVENOUS EVERY 6 HOURS PRN
Status: DISCONTINUED | OUTPATIENT
Start: 2019-09-22 | End: 2019-09-24

## 2019-09-22 RX ORDER — ONDANSETRON 2 MG/ML
4 INJECTION INTRAMUSCULAR; INTRAVENOUS ONCE
Status: COMPLETED | OUTPATIENT
Start: 2019-09-22 | End: 2019-09-22

## 2019-09-22 RX ORDER — PANTOPRAZOLE SODIUM 40 MG/1
40 TABLET, DELAYED RELEASE ORAL
Status: DISCONTINUED | OUTPATIENT
Start: 2019-09-23 | End: 2019-09-24

## 2019-09-22 RX ORDER — HEPARIN SODIUM 5000 [USP'U]/ML
5000 INJECTION, SOLUTION INTRAVENOUS; SUBCUTANEOUS EVERY 8 HOURS SCHEDULED
Status: DISCONTINUED | OUTPATIENT
Start: 2019-09-22 | End: 2019-09-24

## 2019-09-22 NOTE — ED PROVIDER NOTES
Patient Seen in: [facility] Emergency Department      History   Patient presents with:  Fall (musculoskeletal, neurologic)    Stated Complaint: FALL    HPI    24-year-old with a history of GERD, COPD, hypertension, multiple sclerosis presents for ev Laterality Date   • APPENDECTOMY     • BENIGN BIOPSY LEFT  1996   • CHOLECYSTECTOMY     • COLECTOMY     • COLECTOMY     • ENDOSCOPIC RETROGRADE CHOLANGIOPANCREATOGRAPHY (ERCP) N/A 2/27/2014    Performed by Beacher Habermann, MD at [address] [place] [address] ENDOSCOPY   • ENDOSCOPIC FIXATION Right 3/4/2015    Performed by Aquilino Stacy MD at [facility] MAIN OR   • TOTAL ABDOM HYSTERECTOMY     • TOTAL KNEE REPLACEMENT     • VENTRAL HERNIA WITH COMPONENT SEPARATION N/A 9/2/2015    Performed by Pippa Guo MD at [address] [place] [address] Total 8.4 (*)     All other components within normal limits   CBC W/ DIFFERENTIAL - Abnormal; Notable for the following components:    RBC 3.77 (*)     HGB 10.9 (*)     HCT 34.9 (*)     RDW-SD 48.1 (*)     Lymphocyte Absolute 0.92 (*)     All other compone tibia S82.302A 9/22/2019 Unknown             EpicACT:[email or web address],RunParamsURLEncoded:701%7C

## 2019-09-22 NOTE — ED INITIAL ASSESSMENT (HPI)
Patient had recent left knee surgery and today felt her left leg give way and twisted her ankle. EMS administered fentanyl.

## 2019-09-23 LAB
ANION GAP SERPL CALC-SCNC: 5 MMOL/L (ref 0–18)
BASOPHILS # BLD AUTO: 0.06 X10(3) UL (ref 0–0.2)
BASOPHILS NFR BLD AUTO: 0.8 %
BUN BLD-MCNC: 14 MG/DL (ref 7–18)
BUN/CREAT SERPL: 17.7 (ref 10–20)
CALCIUM BLD-MCNC: 8 MG/DL (ref 8.5–10.1)
CHLORIDE SERPL-SCNC: 104 MMOL/L (ref 98–112)
CO2 SERPL-SCNC: 30 MMOL/L (ref 21–32)
CREAT BLD-MCNC: 0.79 MG/DL (ref 0.55–1.02)
DEPRECATED HBV CORE AB SER IA-ACNC: 18.3 NG/ML (ref 18–340)
DEPRECATED RDW RBC AUTO: 48.8 FL (ref 35.1–46.3)
EOSINOPHIL # BLD AUTO: 0.27 X10(3) UL (ref 0–0.7)
EOSINOPHIL NFR BLD AUTO: 3.6 %
ERYTHROCYTE [DISTWIDTH] IN BLOOD BY AUTOMATED COUNT: 14.2 % (ref 11–15)
GLUCOSE BLD-MCNC: 85 MG/DL (ref 70–99)
HCT VFR BLD AUTO: 28.7 % (ref 35–48)
HGB BLD-MCNC: 9 G/DL (ref 12–16)
IMM GRANULOCYTES # BLD AUTO: 0.02 X10(3) UL (ref 0–1)
IMM GRANULOCYTES NFR BLD: 0.3 %
IRON SATURATION: 8 % (ref 15–50)
IRON SERPL-MCNC: 23 UG/DL (ref 50–170)
LYMPHOCYTES # BLD AUTO: 1.83 X10(3) UL (ref 1–4)
LYMPHOCYTES NFR BLD AUTO: 24.6 %
MCH RBC QN AUTO: 29.3 PG (ref 26–34)
MCHC RBC AUTO-ENTMCNC: 31.4 G/DL (ref 31–37)
MCV RBC AUTO: 93.5 FL (ref 80–100)
MONOCYTES # BLD AUTO: 0.72 X10(3) UL (ref 0.1–1)
MONOCYTES NFR BLD AUTO: 9.7 %
NEUTROPHILS # BLD AUTO: 4.54 X10 (3) UL (ref 1.5–7.7)
NEUTROPHILS # BLD AUTO: 4.54 X10(3) UL (ref 1.5–7.7)
NEUTROPHILS NFR BLD AUTO: 61 %
OSMOLALITY SERPL CALC.SUM OF ELEC: 288 MOSM/KG (ref 275–295)
PLATELET # BLD AUTO: 213 10(3)UL (ref 150–450)
POTASSIUM SERPL-SCNC: 3.3 MMOL/L (ref 3.5–5.1)
POTASSIUM SERPL-SCNC: 4 MMOL/L (ref 3.5–5.1)
RBC # BLD AUTO: 3.07 X10(6)UL (ref 3.8–5.3)
SODIUM SERPL-SCNC: 139 MMOL/L (ref 136–145)
TOTAL IRON BINDING CAPACITY: 288 UG/DL (ref 240–450)
TRANSFERRIN SERPL-MCNC: 193 MG/DL (ref 200–360)
WBC # BLD AUTO: 7.4 X10(3) UL (ref 4–11)

## 2019-09-23 PROCEDURE — 94640 AIRWAY INHALATION TREATMENT: CPT

## 2019-09-23 PROCEDURE — 83550 IRON BINDING TEST: CPT | Performed by: HOSPITALIST

## 2019-09-23 PROCEDURE — 97530 THERAPEUTIC ACTIVITIES: CPT

## 2019-09-23 PROCEDURE — 83540 ASSAY OF IRON: CPT | Performed by: HOSPITALIST

## 2019-09-23 PROCEDURE — 82728 ASSAY OF FERRITIN: CPT | Performed by: HOSPITALIST

## 2019-09-23 PROCEDURE — 84132 ASSAY OF SERUM POTASSIUM: CPT | Performed by: HOSPITALIST

## 2019-09-23 PROCEDURE — 97165 OT EVAL LOW COMPLEX 30 MIN: CPT

## 2019-09-23 PROCEDURE — 80048 BASIC METABOLIC PNL TOTAL CA: CPT | Performed by: HOSPITALIST

## 2019-09-23 PROCEDURE — 85025 COMPLETE CBC W/AUTO DIFF WBC: CPT | Performed by: HOSPITALIST

## 2019-09-23 RX ORDER — CALCIUM CARBONATE 200(500)MG
500 TABLET,CHEWABLE ORAL 3 TIMES DAILY PRN
Status: DISCONTINUED | OUTPATIENT
Start: 2019-09-23 | End: 2019-09-24

## 2019-09-23 RX ORDER — POTASSIUM CHLORIDE 20 MEQ/1
40 TABLET, EXTENDED RELEASE ORAL EVERY 4 HOURS
Status: COMPLETED | OUTPATIENT
Start: 2019-09-23 | End: 2019-09-23

## 2019-09-23 RX ORDER — HYDROCODONE BITARTRATE AND ACETAMINOPHEN 10; 325 MG/1; MG/1
1 TABLET ORAL EVERY 4 HOURS
Status: DISCONTINUED | OUTPATIENT
Start: 2019-09-23 | End: 2019-09-24

## 2019-09-23 RX ORDER — LOPERAMIDE HYDROCHLORIDE 2 MG/1
2 CAPSULE ORAL 4 TIMES DAILY PRN
Status: DISCONTINUED | OUTPATIENT
Start: 2019-09-23 | End: 2019-09-24

## 2019-09-23 NOTE — OCCUPATIONAL THERAPY NOTE
OCCUPATIONAL THERAPY EVALUATION - INPATIENT     Room Number: 367/367-A  Evaluation Date: 9/23/2019  Type of Evaluation: Initial  Presenting Problem: Fall with L distal tibia fracture, non-operative    Physician Order: IP Consult to Occupational Therapy  Re further problems   • Renal insufficiency    • Unspecified essential hypertension    • Unspecified sinusitis (chronic)    • Urinary tract infection    • Valvular disease     MVP   • Visual impairment     Right eye       Past Surgical History  Past Surgical ORIF right tibia   • PART REMOVAL COLON W END COLOSTOMY  8-2014    colostomy later reversed   • REMOVAL OF LUNG,LOBECTOMY  2006    right upper lobectomy   • TIBIA IM NAILING Left 3/20/2014    Performed by Vane Sotelo MD at [facility]   • TIBIA OPE COORDINATION  Gross Motor    WFL    Fine Motor    WFL with increased time due to B hand numbness from MS     ADDITIONAL TESTS                                    NEUROLOGICAL FINDINGS  Neurological Findings: None                ACTIVITY TOLERANCE non-operativeily. Complete medical history and occupational profile noted above. Functional outcome measures completed include: AM-PAC \"6 Clicks\".  In this OT evaluation patient presents with the following performance deficits: increased pain, decreased b 18-21 days)  OT Device Recommendations: TBD    PLAN  OT Treatment Plan: Balance activities; ADL training;Functional transfer training; Endurance training;Patient/Family education;Patient/Family training; Compensatory technique education  Rehab Potential : Goo

## 2019-09-23 NOTE — PLAN OF CARE
Plan of care explained and updated with patient input. Progressing per plan of care. Patient is alert and oriented to person, place, time and situation. On room air with continuous pulse oximetry monitoring.  Bilateral hearing loss, with hearing aids bilate

## 2019-09-23 NOTE — CM/SW NOTE
Met with pt to discuss DC planning. Reviewed insurance coverage for [facility] and [facility]. Discussed options for private hire caregivers or private pay NH admission if insurance did not cover [facility]. Pt stated her  will visit this afternoon.   Plan for JESENIA to m

## 2019-09-23 NOTE — PHYSICAL THERAPY NOTE
PHYSICAL THERAPY EVALUATION - INPATIENT     Room Number: 367/367-A  Evaluation Date: 9/23/2019  Type of Evaluation: Initial  Physician Order: PT Eval and Treat    Presenting Problem: S/p Left Tibia Fx - Nonoperative  Reason for Therapy: Mobility Dysf disorder 2014    poor kidney function in past. no further problems   • Renal insufficiency    • Unspecified essential hypertension    • Unspecified sinusitis (chronic)    • Urinary tract infection    • Valvular disease     MVP   • Visual impairment     Rig tibial rodding   • OTHER SURGICAL HISTORY  3/4/15    ORIF right tibia   • PART REMOVAL COLON W END COLOSTOMY  8-2014    colostomy later reversed   • REMOVAL OF LUNG,LOBECTOMY  2006    right upper lobectomy   • TIBIA IM NAILING Left 3/20/2014    Performed b fully aware of deficits    RANGE OF MOTION AND STRENGTH ASSESSMENT  Upper extremity ROM and strength are within functional limits     Lower extremity ROM is within functional limits except for the following: Left LE not tested, Right LE - WFL    Lower extr supervision, Sat with good balance @ edge of bed. Sit to stand with RW required CGA to min assist for safety due to weakness of right LE. Patient was able to maintain NWB on left LE. Patient ambulated 10 ft to bathroom with min assist - maintaining NWB.  Ri function. DISCHARGE RECOMMENDATIONS  PT Discharge Recommendations: Sub-acute rehabilitation The AM-PAC '6-Clicks' Inpatient Basic Mobility Short Form was completed and this patient is demonstrating a 54% degree of impairment in mobility.  Research supports

## 2019-09-23 NOTE — CONSULTS
[facility]  Report of Consultation    Olaf Moreno Patient Status:  Observation    3/8/1950 MRN [id number]   [place] 3SW-A Attending Pierce [place], [address] [facility] Day # 0 PCP Harry Sifuentes MD     Reason for Consultation:  Left dis function in past. no further problems   • Renal insufficiency    • Unspecified essential hypertension    • Unspecified sinusitis (chronic)    • Urinary tract infection    • Valvular disease     MVP   • Visual impairment     Right eye     Past Surgical Hist ORIF right tibia   • PART REMOVAL COLON W END COLOSTOMY  8-2014    colostomy later reversed   • REMOVAL OF LUNG,LOBECTOMY  2006    right upper lobectomy   • TIBIA IM NAILING Left 3/20/2014    Performed by Julio Cesar Frank MD at [address] [facility] [address]   • TIBIA OPEN R Oral, TID PRN  •  [START ON 9/23/2019] diltiazem (CARDIZEM CD) 24 hr cap 360 mg, 360 mg, Oral, Daily  •  [START ON 9/23/2019] ferrous sulfate EC tab 325 mg, 325 mg, Oral, Daily  •  Fluticasone Furoate-Vilanterol (BREO ELLIPTA) 200-25 MCG/INH inhaler 1 puff orientated x3. Cooperative. No apparent distress. Vital Signs:  Blood pressure (!) 170/64, pulse 82, temperature 98.2 °F (36.8 °C), temperature source Oral, resp. rate 17, height 5' 3\" (1.6 m), weight 128 lb (58.1 kg), SpO2 95 %.   HEENT: Exam is [facility] instrumentation. Left knee arthroplasty seen. There is a chronic deformity to the proximal left fibula.     =====  CONCLUSION:  Oblique acute on chronic fracture at the distal left tibia with mild displacement.         Dictated by: Moisés De La Torre MD on bronchitis type ([address]     Sensorineural hearing loss, bilateral     Pancreatic insufficiency     Primary osteoarthritis of left knee  Global 09/11/2019     Pain of left calf     Avascular necrosis (HCC)     Left knee pain, unspecified chronicity     S/P na

## 2019-09-23 NOTE — CM/SW NOTE
Asked to see patient who fell at home. Bob Bang is a 78year old female who has a history of MS and multiple lower extremity ortho surgeries presents to the ED today after a fall. XR reveal distal tibia fracture.      I met with patient and

## 2019-09-23 NOTE — H&P
[facility] hospitalist H+P  PCP Binta Real MD  CC fracture  HPI 79 yo female with multiple medical problems including but not limited to MS, COPD, HTN, GERD, hx of orthopedic surgeries, recent total knee arthropasty, presented after leg gave out and pt fell.  Pravin Soto 1996   • CHOLECYSTECTOMY     • COLECTOMY     • COLECTOMY     • ENDOSCOPIC RETROGRADE CHOLANGIOPANCREATOGRAPHY (ERCP) N/A 2/27/2014    Performed by Analy Temple MD at [facility] ENDOSCOPY   • ENDOSCOPIC ULTRASOUND (EUS) N/A 10/5/2015    Performed by Dion Blair at [facility] MAIN OR   • TOTAL ABDOM HYSTERECTOMY     • TOTAL KNEE REPLACEMENT     • VENTRAL HERNIA WITH COMPONENT SEPARATION N/A 9/2/2015    Performed by Yissel Whitley MD at [facility] MAIN OR     Family History   Problem Relation Age of Onset   • Cancer Sister of clubs or organizations: Not on file        Relationship status: Not on file      Intimate partner violence:        Fear of current or ex partner: Not on file        Emotionally abused: Not on file        Physically abused: Not on file        Forced sexu Disp:  Rfl:    FERROUS SULFATE 325 (65 Fe) MG Oral Tab TAKE 1 TABLET BY MOUTH EVERY DAY Disp: 30 tablet Rfl: 0   furosemide 40 MG Oral Tab Take 20 mg by mouth 2 (two) times daily.  Disp:  Rfl:    Saccharomyces boulardii (FLORASTOR OR) Take 1 tablet by mouth evening. After application press nostrils together several times to distribute ointment. Start 5 days prior to surgery.  Disp: 22 g Rfl: 0     ROS 10 systems reviewed and negative except as in HPI  PE    09/23/19  0730   BP: 128/47   Pulse: 76   Resp: 19 5

## 2019-09-23 NOTE — PROGRESS NOTES
09/23/19 1845   Clinical Encounter Type   Visited With Patient   Sacramental Encounters   Sacrament of Sick-Anointing Anointed  (Raghu Multani provided scripture, prayer  support and SOS)

## 2019-09-23 NOTE — PROGRESS NOTES
09/23/19 0746   Baptism Encounters   Spiritual Requests During Visit / Hospitalization [employer]ion

## 2019-09-24 VITALS
HEIGHT: 63 IN | HEART RATE: 106 BPM | RESPIRATION RATE: 18 BRPM | BODY MASS INDEX: 22.68 KG/M2 | SYSTOLIC BLOOD PRESSURE: 168 MMHG | WEIGHT: 128 LBS | TEMPERATURE: 98 F | OXYGEN SATURATION: 100 % | DIASTOLIC BLOOD PRESSURE: 94 MMHG

## 2019-09-24 LAB
ANION GAP SERPL CALC-SCNC: 4 MMOL/L (ref 0–18)
BASOPHILS # BLD AUTO: 0.03 X10(3) UL (ref 0–0.2)
BASOPHILS NFR BLD AUTO: 0.5 %
BUN BLD-MCNC: 7 MG/DL (ref 7–18)
BUN/CREAT SERPL: 11.1 (ref 10–20)
CALCIUM BLD-MCNC: 8 MG/DL (ref 8.5–10.1)
CHLORIDE SERPL-SCNC: 107 MMOL/L (ref 98–112)
CO2 SERPL-SCNC: 28 MMOL/L (ref 21–32)
CREAT BLD-MCNC: 0.63 MG/DL (ref 0.55–1.02)
DEPRECATED RDW RBC AUTO: 48.9 FL (ref 35.1–46.3)
EOSINOPHIL # BLD AUTO: 0.26 X10(3) UL (ref 0–0.7)
EOSINOPHIL NFR BLD AUTO: 4.3 %
ERYTHROCYTE [DISTWIDTH] IN BLOOD BY AUTOMATED COUNT: 14.2 % (ref 11–15)
GLUCOSE BLD-MCNC: 89 MG/DL (ref 70–99)
HCT VFR BLD AUTO: 28.6 % (ref 35–48)
HGB BLD-MCNC: 8.8 G/DL (ref 12–16)
IMM GRANULOCYTES # BLD AUTO: 0.02 X10(3) UL (ref 0–1)
IMM GRANULOCYTES NFR BLD: 0.3 %
LYMPHOCYTES # BLD AUTO: 1.57 X10(3) UL (ref 1–4)
LYMPHOCYTES NFR BLD AUTO: 26 %
MCH RBC QN AUTO: 29.1 PG (ref 26–34)
MCHC RBC AUTO-ENTMCNC: 30.8 G/DL (ref 31–37)
MCV RBC AUTO: 94.7 FL (ref 80–100)
MONOCYTES # BLD AUTO: 0.71 X10(3) UL (ref 0.1–1)
MONOCYTES NFR BLD AUTO: 11.7 %
NEUTROPHILS # BLD AUTO: 3.46 X10 (3) UL (ref 1.5–7.7)
NEUTROPHILS # BLD AUTO: 3.46 X10(3) UL (ref 1.5–7.7)
NEUTROPHILS NFR BLD AUTO: 57.2 %
OSMOLALITY SERPL CALC.SUM OF ELEC: 285 MOSM/KG (ref 275–295)
PLATELET # BLD AUTO: 191 10(3)UL (ref 150–450)
POTASSIUM SERPL-SCNC: 4.1 MMOL/L (ref 3.5–5.1)
RBC # BLD AUTO: 3.02 X10(6)UL (ref 3.8–5.3)
SODIUM SERPL-SCNC: 139 MMOL/L (ref 136–145)
WBC # BLD AUTO: 6.1 X10(3) UL (ref 4–11)

## 2019-09-24 PROCEDURE — 97116 GAIT TRAINING THERAPY: CPT

## 2019-09-24 PROCEDURE — 80048 BASIC METABOLIC PNL TOTAL CA: CPT | Performed by: HOSPITALIST

## 2019-09-24 PROCEDURE — 90471 IMMUNIZATION ADMIN: CPT

## 2019-09-24 PROCEDURE — 85025 COMPLETE CBC W/AUTO DIFF WBC: CPT | Performed by: HOSPITALIST

## 2019-09-24 PROCEDURE — 97530 THERAPEUTIC ACTIVITIES: CPT

## 2019-09-24 RX ORDER — LOPERAMIDE HYDROCHLORIDE 2 MG/1
2 CAPSULE ORAL 4 TIMES DAILY PRN
Qty: 30 CAPSULE | Refills: 0 | Status: SHIPPED | OUTPATIENT
Start: 2019-09-24 | End: 2019-09-24

## 2019-09-24 RX ORDER — HYDROCODONE BITARTRATE AND ACETAMINOPHEN 10; 325 MG/1; MG/1
1 TABLET ORAL EVERY 4 HOURS
Qty: 30 TABLET | Refills: 0 | Status: SHIPPED | OUTPATIENT
Start: 2019-09-24 | End: 2020-05-06

## 2019-09-24 RX ORDER — POLYETHYLENE GLYCOL 3350 17 G/17G
17 POWDER, FOR SOLUTION ORAL DAILY PRN
Qty: 30 EACH | Refills: 0 | Status: SHIPPED | OUTPATIENT
Start: 2019-09-24 | End: 2020-01-08

## 2019-09-24 NOTE — PLAN OF CARE
Left ankle pain moderately controlled with scheduled norco. Pt stated pain decreases from 8/10 to 2/10 after norco, but that pain control doesn't last 4 hours. Pt requires morphine iv for breakthrough pain.  Pt up to the bathroom with min assist maintaining

## 2019-09-24 NOTE — PLAN OF CARE
Pt. Admitted for L ankle fx from fall at home on 09-22-19, non-operable. Post mold with acewrap, elevated on 2 pillows. Pain level is moderately controlled; Norco only lasts for 3 hours, per patient, requiring Morphine in between.  Ambulates with walker a

## 2019-09-24 NOTE — PHYSICAL THERAPY NOTE
PHYSICAL THERAPY TREATMENT NOTE - INPATIENT    Room Number: 367/367-A     Session: 1   Number of Visits to Meet Established Goals: 5    Presenting Problem: S/p Left Tibia Fx - Nonoperative  History related to current admission: Patient is 78years old fem Unspecified essential hypertension    • Unspecified sinusitis (chronic)    • Urinary tract infection    • Valvular disease     MVP   • Visual impairment     Right eye       Past Surgical History  Past Surgical History:   Procedure Laterality Date   • APPEN END COLOSTOMY  8-2014    colostomy later reversed   • REMOVAL OF LUNG,LOBECTOMY  2006    right upper lobectomy   • TIBIA IM NAILING Left 3/20/2014    Performed by Jennifer Hummel MD at [place] [address] Right 3/4/2015 Score: 17   Approx Degree of Impairment: 50.57%   Standardized Score (AM-PAC Scale): 42.13   CMS Modifier (G-Code): CK    FUNCTIONAL ABILITY STATUS  Gait Assessment   Gait Assistance: Dependent assistance(Actual assist - CGA to min assist)  Distance (ft): Recommendations: Sub-acute rehabilitation The AM-PAC '6-Clicks' Inpatient Basic Mobility Short Form was completed and this patient is demonstrating a 51% degree of impairment in mobility.  Research supports that patients with this level of impairment may be

## 2019-09-24 NOTE — PROGRESS NOTES
NURSING DISCHARGE NOTE    Discharged Rehab facility via [place]. Accompanied by Family member  Belongings Taken by patient/family. Pt assessed and ready for dc. Iv dc'd. Instructions gone over with pt and report called to rosalinda.

## 2019-09-24 NOTE — CM/SW NOTE
Received confirmation from [facility] that they have received insurance auth and are able to accept pt for admission after 1pm today. Pt has used half of her [facility] benefit prior to this hospitalization.   Met with pt who expressed agreement with DC adriana

## 2019-09-27 NOTE — PROGRESS NOTES
[facility] Hospitalist note    PCP: Christiano Martinez MD    Chief Complaint:  F/u leg fracture    SUBJECTIVE:  Pt feels okay. Pain reasonably controlled. No sob.      OBJECTIVE:  Vitals stable    Intake/Output:  No intake or output data in the 24 hours ending 09/26/1 with studies c/w iron def.      Preventative heparin ordered    Flu shot given prior to discharge per pt's request    Sherri Gaytan MD  [facility] Hospitalist  Pager: [phone]

## 2019-11-01 PROBLEM — S82.302D: Status: ACTIVE | Noted: 2019-11-01

## 2019-12-28 NOTE — PHYSICAL THERAPY NOTE
PHYSICAL THERAPY KNEE TREATMENT NOTE - INPATIENT     Room Number: 386/386-A     Session: 4  Number of Visits to Meet Established Goals: 6    Presenting Problem: s/p L TKA and tibial nail removal 6/13/19    Problem List  Active Problems:    Primary osteoar MD at [facility] MAIN OR   • ENDOSCOPIC ULTRASOUND (EUS) N/A 2/27/2014    Performed by Jess Naqvi MD at [facility] ENDOSCOPY   • ERCP,DIAGNOSTIC     • ESOPHAGOGASTRODUODENOSCOPY (EGD) N/A 2/22/2016    Performed by Jess Naqvi MD at [facility] ENDOSCOPY   • ESOPHAGOGAST Bearing Restriction: L lower extremity           L Lower Extremity: Weight Bearing as Tolerated    PAIN ASSESSMENT   Ratin  Location: L knee  Management Techniques:  Activity promotion    BALANCE  Static Sitting: Fair +  Dynamic Sittin [address] to home vs [facility]. Pt plans to talk to her . PM: PCT notified that pt's  wanted to speak, pt ambulated 150' with sup and RW in presence of her .  still concerned about pt returning home and risk of fall.          Exercises AM Dennie Ice able to demonstrate transfers Sit to/from Stand at assistance level: supervison MET 6/15/19    Goal #3     Patient is able to ambulate 150 feet with assistive device at assistance level: supervision  Met   Updated to amb 50' with RW and mod ind.     Goal # full weight-bearing

## 2020-01-10 PROBLEM — R29.898 BILATERAL LEG WEAKNESS: Status: ACTIVE | Noted: 2020-01-10

## 2020-04-16 ENCOUNTER — HOSPITAL ENCOUNTER (EMERGENCY)
Facility: HOSPITAL | Age: 70
Discharge: HOME | End: 2020-04-16
Attending: EMERGENCY MEDICINE
Payer: COMMERCIAL

## 2020-04-16 ENCOUNTER — APPOINTMENT (OUTPATIENT)
Dept: GENERAL RADIOLOGY | Facility: HOSPITAL | Age: 70
End: 2020-04-16
Attending: EMERGENCY MEDICINE
Payer: COMMERCIAL

## 2020-04-16 VITALS
BODY MASS INDEX: 20.73 KG/M2 | TEMPERATURE: 98 F | SYSTOLIC BLOOD PRESSURE: 154 MMHG | HEART RATE: 86 BPM | OXYGEN SATURATION: 100 % | DIASTOLIC BLOOD PRESSURE: 78 MMHG | HEIGHT: 63 IN | WEIGHT: 117 LBS | RESPIRATION RATE: 18 BRPM

## 2020-04-16 DIAGNOSIS — J47.1 BRONCHIECTASIS WITH ACUTE EXACERBATION (HCC): Primary | ICD-10-CM

## 2020-04-16 PROCEDURE — 96374 THER/PROPH/DIAG INJ IV PUSH: CPT

## 2020-04-16 PROCEDURE — 93010 ELECTROCARDIOGRAM REPORT: CPT

## 2020-04-16 PROCEDURE — 93005 ELECTROCARDIOGRAM TRACING: CPT

## 2020-04-16 PROCEDURE — 99285 EMERGENCY DEPT VISIT HI MDM: CPT

## 2020-04-16 PROCEDURE — 87040 BLOOD CULTURE FOR BACTERIA: CPT | Performed by: EMERGENCY MEDICINE

## 2020-04-16 PROCEDURE — 85379 FIBRIN DEGRADATION QUANT: CPT | Performed by: EMERGENCY MEDICINE

## 2020-04-16 PROCEDURE — 71045 X-RAY EXAM CHEST 1 VIEW: CPT | Performed by: EMERGENCY MEDICINE

## 2020-04-16 PROCEDURE — 36415 COLL VENOUS BLD VENIPUNCTURE: CPT

## 2020-04-16 PROCEDURE — 80053 COMPREHEN METABOLIC PANEL: CPT | Performed by: EMERGENCY MEDICINE

## 2020-04-16 PROCEDURE — 85025 COMPLETE CBC W/AUTO DIFF WBC: CPT | Performed by: EMERGENCY MEDICINE

## 2020-04-16 RX ORDER — ALBUTEROL SULFATE 90 UG/1
2 AEROSOL, METERED RESPIRATORY (INHALATION) EVERY 4 HOURS PRN
Qty: 1 INHALER | Refills: 0 | Status: SHIPPED | OUTPATIENT
Start: 2020-04-16 | End: 2020-05-16

## 2020-04-16 RX ORDER — CODEINE PHOSPHATE AND GUAIFENESIN 10; 100 MG/5ML; MG/5ML
5 SOLUTION ORAL EVERY 6 HOURS PRN
Qty: 100 ML | Refills: 0 | Status: SHIPPED | OUTPATIENT
Start: 2020-04-16 | End: 2020-04-21

## 2020-04-16 RX ORDER — LEVOFLOXACIN 500 MG/1
500 TABLET, FILM COATED ORAL DAILY
Qty: 7 TABLET | Refills: 0 | Status: SHIPPED | OUTPATIENT
Start: 2020-04-16 | End: 2020-04-23

## 2020-04-16 RX ORDER — HYDROCODONE BITARTRATE AND ACETAMINOPHEN 5; 325 MG/1; MG/1
1 TABLET ORAL ONCE
Status: COMPLETED | OUTPATIENT
Start: 2020-04-16 | End: 2020-04-16

## 2020-04-16 RX ORDER — ALBUTEROL SULFATE 2.5 MG/3ML
2.5 SOLUTION RESPIRATORY (INHALATION) EVERY 4 HOURS PRN
Qty: 30 AMPULE | Refills: 0 | Status: SHIPPED | OUTPATIENT
Start: 2020-04-16 | End: 2020-05-16

## 2020-04-16 RX ORDER — PREDNISONE 20 MG/1
TABLET ORAL
Qty: 10 TABLET | Refills: 0 | Status: SHIPPED | OUTPATIENT
Start: 2020-04-16 | End: 2020-05-01

## 2020-04-16 RX ORDER — METHYLPREDNISOLONE SODIUM SUCCINATE 125 MG/2ML
125 INJECTION, POWDER, LYOPHILIZED, FOR SOLUTION INTRAMUSCULAR; INTRAVENOUS ONCE
Status: COMPLETED | OUTPATIENT
Start: 2020-04-16 | End: 2020-04-16

## 2020-04-16 NOTE — ED INITIAL ASSESSMENT (HPI)
Pt c/o coug/ fever/HA x 3 days, states mainly a dry cough with intermittent yellow sputum. No fever today per patient.

## 2020-04-22 NOTE — PROGRESS NOTES
Results released to [facility] with providers notes   Nothing further needed from MD or nurse. Closing encounter.

## 2020-08-24 RX ORDER — SODIUM CHLORIDE, SODIUM LACTATE, POTASSIUM CHLORIDE, CALCIUM CHLORIDE 600; 310; 30; 20 MG/100ML; MG/100ML; MG/100ML; MG/100ML
INJECTION, SOLUTION INTRAVENOUS CONTINUOUS
Status: CANCELLED | OUTPATIENT
Start: 2020-08-24

## 2020-08-28 ENCOUNTER — LAB ENCOUNTER (OUTPATIENT)
Dept: LAB | Facility: HOSPITAL | Age: 70
End: 2020-08-28
Attending: Other
Payer: COMMERCIAL

## 2020-08-28 DIAGNOSIS — Z01.812 PRE-PROCEDURE LAB EXAM: Primary | ICD-10-CM

## 2020-08-29 LAB — SARS-COV-2 RNA RESP QL NAA+PROBE: NOT DETECTED

## 2020-08-31 ENCOUNTER — ANESTHESIA (OUTPATIENT)
Dept: MRI IMAGING | Facility: HOSPITAL | Age: 70
End: 2020-08-31
Payer: COMMERCIAL

## 2020-08-31 ENCOUNTER — HOSPITAL ENCOUNTER (OUTPATIENT)
Dept: MRI IMAGING | Facility: HOSPITAL | Age: 70
Discharge: HOME | End: 2020-08-31
Attending: Other
Payer: COMMERCIAL

## 2020-08-31 ENCOUNTER — ANESTHESIA EVENT (OUTPATIENT)
Dept: MRI IMAGING | Facility: HOSPITAL | Age: 70
End: 2020-08-31
Payer: COMMERCIAL

## 2020-08-31 VITALS
WEIGHT: 117 LBS | TEMPERATURE: 98 F | RESPIRATION RATE: 17 BRPM | BODY MASS INDEX: 20.73 KG/M2 | HEIGHT: 63 IN | SYSTOLIC BLOOD PRESSURE: 92 MMHG | DIASTOLIC BLOOD PRESSURE: 75 MMHG | HEART RATE: 72 BPM | OXYGEN SATURATION: 100 %

## 2020-08-31 DIAGNOSIS — G35 MULTIPLE SCLEROSIS (HCC): ICD-10-CM

## 2020-08-31 DIAGNOSIS — G25.3 MYOCLONUS: ICD-10-CM

## 2020-08-31 PROCEDURE — 70553 MRI BRAIN STEM W/O & W/DYE: CPT | Performed by: OTHER

## 2020-08-31 PROCEDURE — A9575 INJ GADOTERATE MEGLUMI 0.1ML: HCPCS

## 2020-08-31 RX ORDER — LIDOCAINE HYDROCHLORIDE 10 MG/ML
INJECTION, SOLUTION EPIDURAL; INFILTRATION; INTRACAUDAL; PERINEURAL AS NEEDED
Status: DISCONTINUED | OUTPATIENT
Start: 2020-08-31 | End: 2020-08-31 | Stop reason: SURG

## 2020-08-31 RX ORDER — ACETAMINOPHEN 500 MG
1000 TABLET ORAL ONCE AS NEEDED
Status: ACTIVE | OUTPATIENT
Start: 2020-08-31 | End: 2020-08-31

## 2020-08-31 RX ORDER — NALOXONE HYDROCHLORIDE 0.4 MG/ML
80 INJECTION, SOLUTION INTRAMUSCULAR; INTRAVENOUS; SUBCUTANEOUS AS NEEDED
Status: ACTIVE | OUTPATIENT
Start: 2020-08-31 | End: 2020-08-31

## 2020-08-31 RX ORDER — ONDANSETRON 2 MG/ML
4 INJECTION INTRAMUSCULAR; INTRAVENOUS AS NEEDED
Status: ACTIVE | OUTPATIENT
Start: 2020-08-31 | End: 2020-08-31

## 2020-08-31 RX ORDER — HYDROCODONE BITARTRATE AND ACETAMINOPHEN 5; 325 MG/1; MG/1
1 TABLET ORAL AS NEEDED
Status: DISCONTINUED | OUTPATIENT
Start: 2020-08-31 | End: 2020-09-02

## 2020-08-31 RX ORDER — HYDROCODONE BITARTRATE AND ACETAMINOPHEN 5; 325 MG/1; MG/1
2 TABLET ORAL AS NEEDED
Status: DISCONTINUED | OUTPATIENT
Start: 2020-08-31 | End: 2020-09-02

## 2020-08-31 RX ORDER — SODIUM CHLORIDE, SODIUM LACTATE, POTASSIUM CHLORIDE, CALCIUM CHLORIDE 600; 310; 30; 20 MG/100ML; MG/100ML; MG/100ML; MG/100ML
INJECTION, SOLUTION INTRAVENOUS CONTINUOUS
Status: DISCONTINUED | OUTPATIENT
Start: 2020-08-31 | End: 2020-09-02

## 2020-08-31 RX ADMIN — LIDOCAINE HYDROCHLORIDE 50 MG: 10 INJECTION, SOLUTION EPIDURAL; INFILTRATION; INTRACAUDAL; PERINEURAL at 13:56:00

## 2020-08-31 NOTE — ANESTHESIA POSTPROCEDURE EVALUATION
[address] [place] [address] Patient Status:  Outpatient   Age/Gender 69year old female MRN [id number]   [facility] MRI Attending Steven Seals MD   Hosp Day # 0 PCP Lucy Herring MD       Anesthesia Post-op Note    * No procedures

## 2020-08-31 NOTE — ANESTHESIA PREPROCEDURE EVALUATION
PRE-OP EVALUATION    Patient Name: Rosy Mart    Pre-op Diagnosis: * No surgery found *    * No surgery found *    * Surgery not found *    Pre-op vitals reviewed.   Temp: 96.5 °F (35.8 °C)  Pulse: 79  Resp: 17  BP: 127/55  SpO2: 100 %  Body mass i twice daily, morning and evening. After application press nostrils together several times to distribute ointment. Start 5 days prior to surgery. , Disp: 22 g, Rfl: 0  docusate sodium (COLACE) 100 MG Oral Cap, Take 1 capsule (100 mg total) by mouth 2 (two) Deana Liz MD at [address] [place] [address] (EUS) N/A 10/5/2015    Performed by Deana Liz MD at [facility] MAIN OR   • ENDOSCOPIC ULTRASOUND (EUS) N/A 2/27/2014    Performed by Deana Liz MD at [facility] ENDOSCOPY   • ERCP,DIAGNOSTIC     • Yissel hWitley MD at [facility] MAIN OR     Social History    Tobacco Use      Smoking status: Former Smoker        Packs/day: 0.10        Years: 4.00        Pack years: .4        Types: Cigarettes        Quit date: 1972        Years since quittin.6

## 2020-08-31 NOTE — IMAGING NOTE
Morenita Delay arrives with  to [employer]. Pt name,  and allergies verified with patient. Pt here for MRI with Anesthesia. States NPO since midnight. Pre-procedure vitals stable. IV access established to left port-a-cath. Procedure ex

## 2020-09-01 NOTE — PROGRESS NOTES
Osvaldo Koch,    I reviewed your MRI Brain. Overall, it is stable. There are no new enhancing lesions. There are just a few spots that are seen on this current MRI which were not seen in the 2013 MRI. How are the tremors and body jerks?     Thanks

## 2021-12-28 PROBLEM — I65.23 ATHEROSCLEROSIS OF BOTH CAROTID ARTERIES: Status: ACTIVE | Noted: 2021-12-28

## 2023-02-10 NOTE — CM/SW NOTE
09/24/19 1600   Discharge disposition   Expected discharge disposition Skilled Nurs   Name of Oli Kunz   Discharge transportation [facility] [Mediport] : Mediport [Scars ___] : scars [unfilled] [Neuro-onc exam] : PERRLA, EOMI, cranial nerves II-XII grossly intact, motor exam 5/5 throughout, sensory exam intact, normal patellar DTRs, no dysmetria, normal gait, no ataxia on tandem gait [80: Active, but tires more quickly] : 80: Active, but tires more quickly [Normal] : mucous membranes moist, no mouth sores or mucosal bleeding, normal dentition, symmetric facies [de-identified] : hair re-growht [de-identified] : no sores [de-identified] : no tenderness of palpation [de-identified] : MedPort incision scar

## (undated) DEVICE — GOWN,SIRUS,FABRIC-REINFORCED,X-LARGE: Brand: MEDLINE

## (undated) DEVICE — SUTURE VICRYL 2-0 CP-1

## (undated) DEVICE — BOWL CEMENT MIX QUICK-VAC

## (undated) DEVICE — TOTAL KNEE CDS: Brand: MEDLINE INDUSTRIES, INC.

## (undated) DEVICE — GAMMEX® PI HYBRID SIZE 8.5, STERILE POWDER-FREE SURGICAL GLOVE, POLYISOPRENE AND NEOPRENE BLEND: Brand: GAMMEX

## (undated) DEVICE — SPECIMEN CONTAINER,POSITIVE SEAL INDICATOR, OR PACKAGED: Brand: PRECISION

## (undated) DEVICE — CHLORAPREP 26ML APPLICATOR

## (undated) DEVICE — SIGMA LCS HIGH PERFORMANCE INSTRUMENTS STERILE THREADED PINS: Brand: SIGMA LCS HIGH PERFORMANCE

## (undated) DEVICE — 2T11 #2 PDO 36 X 36: Brand: 2T11 #2 PDO 36 X 36

## (undated) DEVICE — WRAP COOLING KNEE W/ICE PILLOW

## (undated) DEVICE — Device

## (undated) DEVICE — DRESSING AQUACEL AG 3.5X12

## (undated) DEVICE — SIGMA LCS HIGH PERFORMANCE INSTRUMENTS STERILE THREADED PINS
Type: IMPLANTABLE DEVICE | Site: KNEE
Brand: SIGMA LCS HIGH PERFORMANCE

## (undated) DEVICE — DRAPE,U/SHT,SPLIT,FILM,60X84,STERILE: Brand: MEDLINE

## (undated) DEVICE — CONVERTORS STOCKINETTE: Brand: CONVERTORS

## (undated) DEVICE — STERILE POLYISOPRENE POWDER-FREE SURGICAL GLOVES: Brand: PROTEXIS

## (undated) DEVICE — SIGMA LCS HIGH PERFORMANCE STERILE THREADED HEADED PINS: Brand: SIGMA LCS HIGH PERFORMANCE

## (undated) DEVICE — HOOD, PEEL-AWAY: Brand: FLYTE

## (undated) DEVICE — KENDALL SCD EXPRESS SLEEVES, KNEE LENGTH, MEDIUM: Brand: KENDALL SCD

## (undated) DEVICE — ZIMMER® STERILE DISPOSABLE TOURNIQUET CUFF WITH PLC, DUAL PORT, SINGLE BLADDER, 34 IN. (86 CM)

## (undated) DEVICE — GAMMEX® NON-LATEX PI ORTHO SIZE 8, STERILE POLYISOPRENE POWDER-FREE SURGICAL GLOVE: Brand: GAMMEX

## (undated) DEVICE — 3M™ TEGADERM™ TRANSPARENT FILM DRESSING, 1626W, 4 IN X 4-3/4 IN (10 CM X 12 CM), 50 EACH/CARTON, 4 CARTON/CASE: Brand: 3M™ TEGADERM™

## (undated) DEVICE — STERILE PATIENT PROTECTIVE PAD FOR IMP® KNEE POSITIONERS & COHESIVE WRAP (10 / CASE): Brand: DE MAYO KNEE POSITIONER®

## (undated) DEVICE — PADDING CAST COTTON  4

## (undated) DEVICE — DECANTER BAG 9": Brand: MEDLINE INDUSTRIES, INC.

## (undated) DEVICE — SUTURE FIBERWIRE 2 AR-7202

## (undated) DEVICE — Device: Brand: STABLECUT®

## (undated) DEVICE — 450 ML BOTTLE OF 0.05% CHLORHEXIDINE GLUCONATE IN 99.95% STERILE WATER FOR IRRIGATION, USP AND APPLICATOR.: Brand: IRRISEPT ANTIMICROBIAL WOUND LAVAGE

## (undated) DEVICE — SOL  .9 1000ML BTL

## (undated) NOTE — LETTER
[address] Removal Waiver:    I hereby acknowledge that [facility] staff attempted the following interventions to remove my jewelry:     q  Elevate extremity    q  Ice extremity    q  Use soap & water    q  Use lotions or lubricant    q Alice James   :  3/8/1950  MRN:  [id number]  CSN:  [id number]  Maykel Banerjee Md  [facility] PERIOPERATIVE SVC

## (undated) NOTE — LETTER
[facility] [address], [address] [place] [address]    Consent for Anesthesia   1.   Shavno WOODS agree to be cared for by a physician anesthesiologist alone and/or with a nurse anesthetist, who is specially trained to monitor me and give · Rare risks include: remembering what happened during my procedure, allergic reactions to medications, injury to my airway, heart, lungs, vision, nerves, or muscles and in extremely rare instances death.   5. My doctor has explained to me other choices deena Patient Name: Romayne Royal     : 3/8/1950                 Printed: 2020    Medical Record #: [id number]                                            Page 1 of 1

## (undated) NOTE — LETTER
[facility] Testing Department  Phone: [phone]  Right Fax: [phone]  Deon 20 Raymond Jones RN Date: 6/10/19    Patient Name: Jamari Live  Surgery Date: 6/13/2019    CSN: [id number]  Medical Record: [id number]

## (undated) NOTE — ED AVS SNAPSHOT
Krystin Perlita   MRN: [id number]    Department:  [facility] Emergency Department   Date of Visit:  7/22/2019           Disclosure     Insurance plans vary and the physician(s) referred by the ER may not be covered by your plan.  Please conta tell this physician (or your personal doctor if your instructions are to return to your personal doctor) about any new or lasting problems. The primary care or specialist physician will see patients referred from the [facility] Emergency Department.  Shelton Lombard

## (undated) NOTE — LETTER
[facility] Testing Department  Phone: [phone]  Right Fax: [phone]    ADDRESSEE INFORMATION: SENDER INFORMATION:   To:   Dr. Anant Lopez From: Sami Young RN     Department: Pre-Admission Testing   Fax Number: [phone] Date: 8/24/2020     Phone communication in error, please immediately notify us by telephone and return the original message to us at [address] via the [employer].   10/5/2018

## (undated) NOTE — IP AVS SNAPSHOT
Patient Demographics     Address  [address] [place] [address]  Nerissa Wilson [address] Phone  [phone] [facility]) *Preferred*  [phone] [facility]) E-mail Address  [email or web address]      Emergency Contact(s)     Name Relation Home Work [address] [facility] dilTIAZem HCl  MG Cp24  Commonly known as:  DILACOR XR      Take 360 mg by mouth daily. [    ]   [    ]   [    ]   [    ]     docusate sodium 100 MG Caps  Commonly known as:  COLACE      Take 1 capsule (100 mg total) by mouth 2 (two) times daily. Take 2 capsules (100 mg total) by mouth nightly. Jonnathan Westfall MD   [    ]   [    ]   [    ]   [    ]     Nortriptyline HCl 75 MG Caps  Commonly known as:  PAMELOR      TAKE 1 CAPSULE (75 MG TOTAL) BY MOUTH NIGHTLY.     [    ]   [    ]   [    ]   [    ] HYDROcodone-acetaminophen  MG Tabs           367-367-A - MAR ACTION REPORT  (last 24 hrs)    ** SITE UNKNOWN **     Order ID Medication Name Action Time Action Reason Comments    [id number] Calcium Carbonate Antacid (TUMS) chewable tab 500 mg 09/23/19 [id number] morphINE sulfate (PF) 4 MG/ML injection 2 mg (Or Linked Group #1) 09/23/19 1451 Given      [id number] morphINE sulfate (PF) 4 MG/ML injection 2 mg 09/23/19 2007 Given      [id number] morphINE sulfate (PF) 4 MG/ML injection 2 mg 09/24/19 0007 Given CO2 28.0 21.0 - 32.0 mmol/L — [facility]   Anion Gap 4 0 - 18 mmol/L — [facility]   BUN 7 7 - 18 mg/dL [facility]   Creatinine 0.63 0.55 - 1.02 mg/dL — [facility]   BUN/CREA Ratio 11.1 10.0 - 20.0 — [facility]   Calcium, Total 8.0 8.5 - 10.1 mg/dL  Nawaf Filed:  9/23/2019 12:24 PM Date of Service:  9/23/2019  9:28 AM Status:  Signed    :  Keisha Wall MD (Physician)       [facility] hospitalist H+P  PCP Henny Noyola MD  CC fracture  HPI 77 yo female with multiple medical problems including but not limited • Valvular disease     MVP   • Visual impairment     Right eye     Past Surgical History:   Procedure Laterality Date   • APPENDECTOMY     • BENIGN BIOPSY LEFT  1996   • CHOLECYSTECTOMY     • COLECTOMY     • COLECTOMY     • ENDOSCOPIC RETROGRADE CHOLANGIOP • TIBIA IM NAILING Left 3/20/2014    Performed by Vane Sotelo MD at [facility] MAIN OR   • [address] Right 3/4/2015    Performed by Shivam Martinez MD at [facility] MAIN OR   • TOTAL ABDOM HYSTERECTOMY     • TOTAL KNEE REPLACEMENT     • Talks on phone: Not on file        Gets together: Not on file        Attends [employer] service: Not on file        Active member of club or organization: Not on file        Attends meetings of clubs or organizations: Not on file        Relationship s TOTAL) BY MOUTH 2 (TWO) TIMES DAILY BEFORE MEALS. Disp: 120 capsule Rfl: 1   lisinopril 20 MG Oral Tab Take 1 tablet (20 mg total) by mouth daily. Disp: 90 tablet Rfl: 0   aspirin 325 MG Oral Tab Take 325 mg by mouth daily.  Disp:  Rfl:    FERROUS SULFATE 3 SUMATRIPTAN SUCCINATE 100 MG Oral Tab TAKE 1 TABLET BY MOUTH EVERY 2 (TWO) HOURS AS NEEDED FOR MIGRAINE.  Disp: 9 tablet Rfl: 3   mupirocin 2 % External Ointment Use a q-tip to apply a pea-size amount of ointment to each nostril twice daily, morning and casey ID.2 Darrion Parikh MD on 9/23/2019 12:17 PM                        Consults - MD Consult Notes      Consults signed by Jeancarlos Tabares MD at 9/22/2019  9:21 PM     Author:  Jeancarlos Tabares MD Service:  Orthopedics Author Type:  Physician    Filed:  9/ bilateral legs   • [facility] 6/14/2012   • Osteoarthrosis, unspecified whether generalized or localized, unspecified site    • OTHER DISEASES     MS   • Pancreatic insufficiency 3/17/2017   • Pneumonia, organism unspecified(486)    • PONV (p • KNEE TOTAL REPLACEMENT Left 2019    Performed by James Boogie MD at [facility] MAIN OR   • LAPAROSCOPIC CHOLECYSTECTOMY N/A 3/11/2014    Performed by Tracy Pringle MD at [address] [facility] [address]   •   x3   • OTHER SURGICAL HISTORY  around     right upper l Magnesium               HIVES  Protonix [Pantopraz*        Comment:hyponatremia  Sulfa Antibiotics       SWELLING    Comment:Throat closure    Medications:    Current Facility-Administered Medications:   •  Albuterol Sulfate  (90 Base) MCG/ACT inhal •  PEG 3350 (MIRALAX) powder packet 17 g, 17 g, Oral, Daily PRN  •  bisacodyl (DULCOLAX) rectal suppository 10 mg, 10 mg, Rectal, Daily PRN  •  FLEET ENEMA (FLEET) 7-19 GM/118ML enema 133 mL, 1 enema, Rectal, Once PRN  •  ondansetron HCl (ZOFRAN) injection COMPARISON:  [facility] , XR TIBIA + FIBULA (2 VIEWS), LEFT (CPT=73590), 3/18/2014, 21:47. INDICATIONS:  FALL     PATIENT STATED HISTORY: (As transcribed by Technologist)  Patient was using her walker when her left leg gave out.  She has left ankle pain and Sedative, hypnotic or anxiolytic dependence, unspecified     Depressive disorder, not elsewhere classified     Depression     MVP (mitral valve prolapse)     Status post partial resection of colon     Pyelonephritis     Aftercare for healing traumatic f Filed:  9/23/2019 12:14 PM Date of Service:  9/23/2019 10:15 AM Status:  Signed    :  Olinda Landin PT (Physical Therapist)             PHYSICAL THERAPY EVALUATION - INPATIENT     Room Number: 367/367-A  Evaluation Date: 9/23/2019  Type of Evalu • Pneumonia, organism unspecified(486)    • PONV (postoperative nausea and vomiting)    • PTSD (post-traumatic stress disorder)    • Renal disorder 2014    poor kidney function in past. no further problems   • Renal insufficiency    • Unspecified essential Performed by Jessica Marie MD at [address]   •   x3   • OTHER SURGICAL HISTORY  around     right upper lobectomy: due to infection   • OTHER SURGICAL HISTORY  2014    left tibial rodding   • OTHER SURGICAL HISTORY  3/4/15    ORIF right tibia · Overall Cognitive Status:  WFL - within functional limits  · Safety Judgement:  good awareness of safety precautions  · Awareness of Errors:  good awareness of errors made  · Awareness of Deficits:  fully aware of deficits    RANGE OF MOTION AND STRENGTH Skilled Therapy Provided: Evaluation completed, Patient was educated in fall prevention strategies & NWB status on left LE. Patient was able to practice supine to sit & sit to supine with occasional CGA to supervision, Sat with good balance @ edge of bed. limitations in independent bed mobility, transfers, and gait safety & skills. The patient is below baseline and would benefit from skilled inpatient PT to address the above deficits to assist patient in returning to prior to level of function.   DISCHARGE Filed:  9/23/2019  2:30 PM Date of Service:  9/23/2019 10:35 AM Status:  Signed    :  Sarah Murrieta OT (Occupational Therapist)       OCCUPATIONAL THERAPY EVALUATION - INPATIENT     Room Number: 367/367-A  Evaluation Date: 9/23/2019  Type of E • PONV (postoperative nausea and vomiting)    • PTSD (post-traumatic stress disorder)    • Renal disorder 2014    poor kidney function in past. no further problems   • Renal insufficiency    • Unspecified essential hypertension    • Unspecified sinusitis ( • OTHER SURGICAL HISTORY  around 2006    right upper lobectomy: due to infection   • OTHER SURGICAL HISTORY  6/2014    left tibial rodding   • OTHER SURGICAL HISTORY  3/4/15    ORIF right tibia   • PART REMOVAL COLON W END COLOSTOMY  8-2014    colostomy la Communication:[BW.1] WFL[BW.2]    Behavioral/Emotional/Social:[BW.1] WFL[BW.2]    RANGE OF MOTION AND STRENGTH ASSESSMENT  Upper extremity ROM is within functional limits     Upper extremity strength is within functional limits     COORDINATION  Gross Nneka Patient End of Session: Up in chair;Needs met;Call light within reach;RN aware of session/findings; All patient questions and concerns addressed;SCDs in place; Family present    ASSESSMENT[BW.1]   Patient is a 78year old  female admitted 9/22/2019 for fall 1 - 3 performance deficits[BW.2]    Client Assessment/Performance Deficits[BW.1] LOW - No comorbidities nor modifications of tasks[BW.2]    Clinical Decision Making[BW.1] LOW - Analysis of occupational profile, problem-focused assessments, limited treatmen TDAP 05/20/19     Zoster Vaccine Live (Zostavax) 02/14/11     Zoster Vaccine Recombinant Adjuvanted (Shingrix) 10/10/18     Zoster Vaccine Recombinant Adjuvanted (Shingrix) 08/10/18     Zoster Vaccine Recombinant Adjuvanted (Shingrix) 05/10/18

## (undated) NOTE — ED AVS SNAPSHOT
Xenia Velazquez   MRN: [id number]    Department:  [facility] Emergency Department   Date of Visit:  12/21/2017           Disclosure     Insurance plans vary and the physician(s) referred by the ER may not be covered by your plan.  Please cont tell this physician (or your personal doctor if your instructions are to return to your personal doctor) about any new or lasting problems. The primary care or specialist physician will see patients referred from the [facility] Emergency Department.  Annabella Terry

## (undated) NOTE — LETTER
[facility] Testing Department  Phone: [phone]  OUTSIDE TESTING RESULT REQUEST      TO:   Dr. Natalya aSntoyo Date: 8/24/20    FAX #: [phone]     IMPORTANT: FOR YOUR IMMEDIATE ATTENTION  Please FAX all test results listed below to: [phone]

## (undated) NOTE — ED AVS SNAPSHOT
Bob Bang   MRN: [id number]    Department:  [facility] Emergency Department   Date of Visit:  8/16/2019           Disclosure     Insurance plans vary and the physician(s) referred by the ER may not be covered by your plan.  Please conta tell this physician (or your personal doctor if your instructions are to return to your personal doctor) about any new or lasting problems. The primary care or specialist physician will see patients referred from the [facility] Emergency Department.  Severo Pastures

## (undated) NOTE — IP AVS SNAPSHOT
[address]            (For Outpatient Use Only) Initial Admit Date: 9/22/2019   Inpt/Obs Admit Date: Inpt: N/A / Obs: 09/22/19   Discharge Date:    Margaret Copier:  [de-identified]   MRN: [de-identified]   CSN: [id number]   CEID: [id number] Subscriber ID: [id number] Pt Rel to Subscriber: SELF   TERTIARY INSURANCE   Payor:  Plan:    Group Number:  Insurance Type:    Subscriber Name:  Subscriber :    Subscriber ID:  Pt Rel to Subscriber:    Hospital Account Financial Class: Managed Care